# Patient Record
Sex: FEMALE | Race: WHITE | NOT HISPANIC OR LATINO | Employment: PART TIME | URBAN - METROPOLITAN AREA
[De-identification: names, ages, dates, MRNs, and addresses within clinical notes are randomized per-mention and may not be internally consistent; named-entity substitution may affect disease eponyms.]

---

## 2018-01-23 ENCOUNTER — LAB VISIT (OUTPATIENT)
Dept: LAB | Facility: HOSPITAL | Age: 28
End: 2018-01-23
Attending: NURSE PRACTITIONER
Payer: COMMERCIAL

## 2018-01-23 ENCOUNTER — PATIENT MESSAGE (OUTPATIENT)
Dept: GASTROENTEROLOGY | Facility: CLINIC | Age: 28
End: 2018-01-23

## 2018-01-23 ENCOUNTER — OFFICE VISIT (OUTPATIENT)
Dept: GASTROENTEROLOGY | Facility: CLINIC | Age: 28
End: 2018-01-23
Payer: COMMERCIAL

## 2018-01-23 VITALS
HEIGHT: 66 IN | DIASTOLIC BLOOD PRESSURE: 78 MMHG | WEIGHT: 136.88 LBS | BODY MASS INDEX: 22 KG/M2 | HEART RATE: 73 BPM | SYSTOLIC BLOOD PRESSURE: 123 MMHG

## 2018-01-23 DIAGNOSIS — R10.13 ABDOMINAL PAIN, EPIGASTRIC: Primary | ICD-10-CM

## 2018-01-23 DIAGNOSIS — R10.13 ABDOMINAL PAIN, EPIGASTRIC: ICD-10-CM

## 2018-01-23 DIAGNOSIS — R14.0 ABDOMINAL BLOATING: ICD-10-CM

## 2018-01-23 DIAGNOSIS — R10.13 EPIGASTRIC PAIN: ICD-10-CM

## 2018-01-23 LAB
ALBUMIN SERPL BCP-MCNC: 4 G/DL
ALP SERPL-CCNC: 49 U/L
ALT SERPL W/O P-5'-P-CCNC: 20 U/L
ANION GAP SERPL CALC-SCNC: 6 MMOL/L
AST SERPL-CCNC: 24 U/L
BASOPHILS # BLD AUTO: 0.03 K/UL
BASOPHILS NFR BLD: 0.4 %
BILIRUB SERPL-MCNC: 0.4 MG/DL
BUN SERPL-MCNC: 19 MG/DL
CALCIUM SERPL-MCNC: 10.2 MG/DL
CHLORIDE SERPL-SCNC: 102 MMOL/L
CO2 SERPL-SCNC: 30 MMOL/L
CREAT SERPL-MCNC: 0.8 MG/DL
DIFFERENTIAL METHOD: ABNORMAL
EOSINOPHIL # BLD AUTO: 0.2 K/UL
EOSINOPHIL NFR BLD: 2.2 %
ERYTHROCYTE [DISTWIDTH] IN BLOOD BY AUTOMATED COUNT: 12 %
EST. GFR  (AFRICAN AMERICAN): >60 ML/MIN/1.73 M^2
EST. GFR  (NON AFRICAN AMERICAN): >60 ML/MIN/1.73 M^2
GLUCOSE SERPL-MCNC: 86 MG/DL
HCT VFR BLD AUTO: 41.3 %
HGB BLD-MCNC: 14.1 G/DL
IGA SERPL-MCNC: 197 MG/DL
LIPASE SERPL-CCNC: 61 U/L
LYMPHOCYTES # BLD AUTO: 2.7 K/UL
LYMPHOCYTES NFR BLD: 34.8 %
MCH RBC QN AUTO: 32.9 PG
MCHC RBC AUTO-ENTMCNC: 34.1 G/DL
MCV RBC AUTO: 97 FL
MONOCYTES # BLD AUTO: 0.3 K/UL
MONOCYTES NFR BLD: 3.5 %
NEUTROPHILS # BLD AUTO: 4.6 K/UL
NEUTROPHILS NFR BLD: 59.1 %
PLATELET # BLD AUTO: 302 K/UL
PMV BLD AUTO: 10.3 FL
POTASSIUM SERPL-SCNC: 4.5 MMOL/L
PROT SERPL-MCNC: 8 G/DL
RBC # BLD AUTO: 4.28 M/UL
SODIUM SERPL-SCNC: 138 MMOL/L
WBC # BLD AUTO: 7.72 K/UL

## 2018-01-23 PROCEDURE — 83516 IMMUNOASSAY NONANTIBODY: CPT

## 2018-01-23 PROCEDURE — 99999 PR PBB SHADOW E&M-NEW PATIENT-LVL III: CPT | Mod: PBBFAC,,, | Performed by: NURSE PRACTITIONER

## 2018-01-23 PROCEDURE — 80053 COMPREHEN METABOLIC PANEL: CPT

## 2018-01-23 PROCEDURE — 82784 ASSAY IGA/IGD/IGG/IGM EACH: CPT

## 2018-01-23 PROCEDURE — 99203 OFFICE O/P NEW LOW 30 MIN: CPT | Mod: S$GLB,,, | Performed by: NURSE PRACTITIONER

## 2018-01-23 PROCEDURE — 36415 COLL VENOUS BLD VENIPUNCTURE: CPT

## 2018-01-23 PROCEDURE — 85025 COMPLETE CBC W/AUTO DIFF WBC: CPT

## 2018-01-23 PROCEDURE — 83690 ASSAY OF LIPASE: CPT

## 2018-01-23 RX ORDER — LISDEXAMFETAMINE DIMESYLATE 50 MG/1
50 CAPSULE ORAL EVERY MORNING
COMMUNITY
End: 2018-08-02 | Stop reason: SDUPTHER

## 2018-01-23 NOTE — PROGRESS NOTES
Subjective:       Patient ID: James Avelar is a 27 y.o. female.    Chief Complaint: GI Problem (Lactose problems )    HPI  Reports complaints of abdominal swelling and distention with bloating after eating.  She was seen previously by a dietician with fructose and lactose testing.  She has been told previously that she had lactose intolerance.  She used low FODMAP diet and found that she could not tolerate onion, garlic and wheat but particularly the onions and garlic.  Reports however, that avoiding these foods has been very difficult and is affecting her social life and relationships.  She is getting  in October and wants to be able to eat the food at her wedding.    She reports at times that she will have severe pain along with the pressure and bloating.  She denies nausea.  Has had two episodes of vomiting that were associated with larger meal.  Denies heartburn, reflux, indigestion.  Denies bowel changes, diarrhea, black stools.  Reports mother and sister with IBS.      Review of Systems   Constitutional: Negative.  Negative for activity change, appetite change, fatigue, fever and unexpected weight change.   Respiratory: Negative for shortness of breath.    Cardiovascular: Negative for chest pain.   Gastrointestinal: Positive for abdominal distention, abdominal pain and constipation. Negative for blood in stool, diarrhea, nausea and vomiting.   Genitourinary: Negative.    Musculoskeletal: Negative.    Skin: Negative.    Neurological: Negative.    Psychiatric/Behavioral: Negative.        Objective:      Physical Exam   Constitutional: She is oriented to person, place, and time. She appears well-developed and well-nourished. No distress.   HENT:   Head: Normocephalic.   Eyes: No scleral icterus.   Cardiovascular: Normal rate.    Pulmonary/Chest: Effort normal. No respiratory distress.   Abdominal: Soft. Bowel sounds are normal. She exhibits no distension and no mass. There is no tenderness. There is no  guarding.   Musculoskeletal: Normal range of motion.   Neurological: She is alert and oriented to person, place, and time.   Skin: Skin is warm and dry. She is not diaphoretic.   Psychiatric: She has a normal mood and affect. Her behavior is normal. Judgment and thought content normal.   Vitals reviewed.      Assessment:       1. Abdominal pain, epigastric    2. Epigastric pain    3. Abdominal bloating        Plan:         James was seen today for gi problem.    Diagnoses and all orders for this visit:    Abdominal pain, epigastric  -     CBC auto differential; Future  -     Comprehensive metabolic panel; Future  -     Lipase; Future  -     Tissue transglutaminase, IgA; Future  -     IgA; Future  -     US Abdomen Complete; Future    Epigastric pain  -     US Abdomen Complete; Future    Abdominal bloating  -     US Abdomen Complete; Future    Will check labs and US and then can attempt trial of medications for symptom control.  Have discussed food intolerances and functional digestive disorders.    Could consider trial of flagyl for the possibility of SIBO.     Also consider trial of gaviscon and/or bentyl.    Can consider EGD if complaints are persistent.

## 2018-01-25 ENCOUNTER — TELEPHONE (OUTPATIENT)
Dept: GASTROENTEROLOGY | Facility: CLINIC | Age: 28
End: 2018-01-25

## 2018-01-25 LAB — TTG IGA SER IA-ACNC: 4 UNITS

## 2018-01-25 NOTE — TELEPHONE ENCOUNTER
----- Message from SONIA English sent at 1/25/2018  4:23 PM CST -----  Let her know that labs look ok.  Lipase very minimally above normal and not concerning at this time.  Keep US appt.

## 2018-01-30 ENCOUNTER — PATIENT MESSAGE (OUTPATIENT)
Dept: GASTROENTEROLOGY | Facility: CLINIC | Age: 28
End: 2018-01-30

## 2018-01-31 ENCOUNTER — HOSPITAL ENCOUNTER (OUTPATIENT)
Dept: RADIOLOGY | Facility: HOSPITAL | Age: 28
Discharge: HOME | End: 2018-01-31
Attending: NURSE PRACTITIONER
Payer: COMMERCIAL

## 2018-01-31 ENCOUNTER — TELEPHONE (OUTPATIENT)
Dept: GASTROENTEROLOGY | Facility: CLINIC | Age: 28
End: 2018-01-31

## 2018-01-31 DIAGNOSIS — R10.13 ABDOMINAL PAIN, EPIGASTRIC: ICD-10-CM

## 2018-01-31 DIAGNOSIS — R14.0 ABDOMINAL BLOATING: ICD-10-CM

## 2018-01-31 DIAGNOSIS — R10.13 EPIGASTRIC PAIN: ICD-10-CM

## 2018-01-31 PROCEDURE — 76700 US EXAM ABDOM COMPLETE: CPT | Mod: TC

## 2018-01-31 PROCEDURE — 76700 US EXAM ABDOM COMPLETE: CPT | Mod: 26,,, | Performed by: RADIOLOGY

## 2018-02-07 ENCOUNTER — TELEPHONE (OUTPATIENT)
Dept: GASTROENTEROLOGY | Facility: CLINIC | Age: 28
End: 2018-02-07

## 2018-02-07 NOTE — TELEPHONE ENCOUNTER
Please call in [facility] , take one bid.  Disp 60 with 6 refills  To [facility].  I could not find in in e-prescribe    This is an OTC medication, but she was told that if we sent as a prescription that she could use her health savings plan to purchase this.

## 2018-05-22 RX ORDER — LISDEXAMFETAMINE DIMESYLATE 50 MG/1
50 CAPSULE ORAL EVERY MORNING
Qty: 30 CAPSULE | Refills: 0 | Status: CANCELLED | OUTPATIENT
Start: 2018-05-22 | End: 2018-08-20

## 2018-08-02 ENCOUNTER — OFFICE VISIT (OUTPATIENT)
Dept: OBSTETRICS AND GYNECOLOGY | Facility: CLINIC | Age: 28
End: 2018-08-02
Attending: OBSTETRICS & GYNECOLOGY
Payer: COMMERCIAL

## 2018-08-02 VITALS
DIASTOLIC BLOOD PRESSURE: 78 MMHG | BODY MASS INDEX: 21.47 KG/M2 | WEIGHT: 136.81 LBS | HEIGHT: 67 IN | SYSTOLIC BLOOD PRESSURE: 126 MMHG

## 2018-08-02 DIAGNOSIS — Z01.419 ENCOUNTER FOR GYNECOLOGICAL EXAMINATION (GENERAL) (ROUTINE) WITHOUT ABNORMAL FINDINGS: Primary | ICD-10-CM

## 2018-08-02 PROCEDURE — 99999 PR PBB SHADOW E&M-EST. PATIENT-LVL III: CPT | Mod: PBBFAC,,, | Performed by: OBSTETRICS & GYNECOLOGY

## 2018-08-02 PROCEDURE — 99395 PREV VISIT EST AGE 18-39: CPT | Mod: S$GLB,,, | Performed by: OBSTETRICS & GYNECOLOGY

## 2018-08-02 PROCEDURE — 88175 CYTOPATH C/V AUTO FLUID REDO: CPT

## 2018-08-02 NOTE — PROGRESS NOTES
Subjective:       Patient ID: James Avelar is a 28 y.o. female.    Chief Complaint:  Annual Exam (last pap  normal)      There is no problem list on file for this patient.      History of Present Illness  28 y.o. yo  here for annual exam. No gyn complaints. Doing well. New patient visit. Getting  in a couple of months. Periods have been irregular in the past and even on OCP. Sometimes has a period and sometimes does not. Sisters had fertility issues so worried. All questions answered. Try continuous pills for wedding as she does not want period for wedding. [place] in [facility].       History reviewed. No pertinent past medical history.    Past Surgical History:   Procedure Laterality Date    APPENDECTOMY         OB History    Para Term  AB Living   0 0 0 0 0 0   SAB TAB Ectopic Multiple Live Births   0 0 0 0 0             Patient's last menstrual period was 2018 (exact date).   Date of Last Pap: No result found    Review of Systems  Review of Systems   Constitutional: Negative for fatigue and unexpected weight change.   Respiratory: Negative for shortness of breath.    Cardiovascular: Negative for chest pain.   Gastrointestinal: Negative for abdominal pain, constipation, diarrhea, nausea and vomiting.   Genitourinary: Negative for dysuria.   Musculoskeletal: Negative for back pain.   Skin: Negative for rash.   Neurological: Negative for headaches.   Hematological: Does not bruise/bleed easily.   Psychiatric/Behavioral: Negative for behavioral problems.        Objective:   Physical Exam:   Constitutional: She is oriented to person, place, and time. Vital signs are normal. She appears well-developed and well-nourished. No distress.        Pulmonary/Chest: She exhibits no mass. Right breast exhibits no mass, no nipple discharge, no skin change, no tenderness, no bleeding and no swelling. Left breast exhibits no mass, no nipple discharge, no skin change, no tenderness, no  bleeding and no swelling. Breasts are symmetrical.        Abdominal: Soft. Normal appearance and bowel sounds are normal. She exhibits no distension and no mass. There is no tenderness. There is no rebound.     Genitourinary: Vagina normal and uterus normal. There is no rash, tenderness, lesion or injury on the right labia. There is no rash, tenderness, lesion or injury on the left labia. Uterus is not deviated, not enlarged, not fixed, not tender, not hosting fibroids and not experiencing uterine prolapse. Cervix is normal. Right adnexum displays no mass, no tenderness and no fullness. Left adnexum displays no mass, no tenderness and no fullness. No erythema, tenderness, rectocele, cystocele or unspecified prolapse of vaginal walls in the vagina. No vaginal discharge found. Cervix exhibits no motion tenderness, no discharge and no friability.           Musculoskeletal: Normal range of motion and moves all extremeties.      Lymphadenopathy:     She has no axillary adenopathy.        Right: No supraclavicular adenopathy present.        Left: No supraclavicular adenopathy present.    Neurological: She is alert and oriented to person, place, and time.    Skin: Skin is warm and dry.    Psychiatric: She has a normal mood and affect. Her behavior is normal. Judgment normal.        Assessment/ Plan:     1. Encounter for gynecological examination (general) (routine) without abnormal findings  norethindrone-ethinyl estradiol (CYCLAFEM 1/35, 28,) 1-35 mg-mcg per tablet       Follow-up with me in 1 year

## 2019-08-07 DIAGNOSIS — Z01.419 ENCOUNTER FOR GYNECOLOGICAL EXAMINATION (GENERAL) (ROUTINE) WITHOUT ABNORMAL FINDINGS: ICD-10-CM

## 2019-08-07 RX ORDER — NORETHINDRONE AND ETHINYL ESTRADIOL 1 MG-35MCG
KIT ORAL
Qty: 84 TABLET | Refills: 0 | Status: SHIPPED | OUTPATIENT
Start: 2019-08-07 | End: 2019-09-05 | Stop reason: SDUPTHER

## 2019-09-05 ENCOUNTER — OFFICE VISIT (OUTPATIENT)
Dept: OBSTETRICS AND GYNECOLOGY | Facility: CLINIC | Age: 29
End: 2019-09-05
Attending: OBSTETRICS & GYNECOLOGY
Payer: COMMERCIAL

## 2019-09-05 VITALS
DIASTOLIC BLOOD PRESSURE: 60 MMHG | HEIGHT: 67 IN | WEIGHT: 149.94 LBS | SYSTOLIC BLOOD PRESSURE: 100 MMHG | BODY MASS INDEX: 23.53 KG/M2

## 2019-09-05 DIAGNOSIS — Z12.4 PAP SMEAR FOR CERVICAL CANCER SCREENING: Primary | ICD-10-CM

## 2019-09-05 DIAGNOSIS — Z01.419 ENCOUNTER FOR GYNECOLOGICAL EXAMINATION (GENERAL) (ROUTINE) WITHOUT ABNORMAL FINDINGS: ICD-10-CM

## 2019-09-05 PROCEDURE — 88175 CYTOPATH C/V AUTO FLUID REDO: CPT

## 2019-09-05 PROCEDURE — 99395 PREV VISIT EST AGE 18-39: CPT | Mod: S$GLB,,, | Performed by: OBSTETRICS & GYNECOLOGY

## 2019-09-05 PROCEDURE — 99395 PR PREVENTIVE VISIT,EST,18-39: ICD-10-PCS | Mod: S$GLB,,, | Performed by: OBSTETRICS & GYNECOLOGY

## 2019-09-05 PROCEDURE — 99999 PR PBB SHADOW E&M-EST. PATIENT-LVL III: ICD-10-PCS | Mod: PBBFAC,,, | Performed by: OBSTETRICS & GYNECOLOGY

## 2019-09-05 PROCEDURE — 99999 PR PBB SHADOW E&M-EST. PATIENT-LVL III: CPT | Mod: PBBFAC,,, | Performed by: OBSTETRICS & GYNECOLOGY

## 2019-09-05 NOTE — PROGRESS NOTES
Subjective:       Patient ID: James Solares is a 29 y.o. female.    Chief Complaint:  Annual Exam      There is no problem list on file for this patient.      History of Present Illness  29 y.o. yo  here for annual exam. No gyn complaints. Doing well. , no complaints. Discussed PNV and future fertility      History reviewed. No pertinent past medical history.    Past Surgical History:   Procedure Laterality Date    APPENDECTOMY         OB History    Para Term  AB Living   0 0 0 0 0 0   SAB TAB Ectopic Multiple Live Births   0 0 0 0 0       Patient's last menstrual period was 2019.   Date of Last Pap: 2018    Review of Systems  Review of Systems   Constitutional: Negative for fatigue and unexpected weight change.   Respiratory: Negative for shortness of breath.    Cardiovascular: Negative for chest pain.   Gastrointestinal: Negative for abdominal pain, constipation, diarrhea, nausea and vomiting.   Genitourinary: Negative for dysuria.   Musculoskeletal: Negative for back pain.   Skin: Negative for rash.   Neurological: Negative for headaches.   Hematological: Does not bruise/bleed easily.   Psychiatric/Behavioral: Negative for behavioral problems.        Objective:   Physical Exam:   Constitutional: She is oriented to person, place, and time. Vital signs are normal. She appears well-developed and well-nourished. No distress.        Pulmonary/Chest: She exhibits no mass. Right breast exhibits no mass, no nipple discharge, no skin change, no tenderness, no bleeding and no swelling. Left breast exhibits no mass, no nipple discharge, no skin change, no tenderness, no bleeding and no swelling. Breasts are symmetrical.        Abdominal: Soft. Normal appearance and bowel sounds are normal. She exhibits no distension and no mass. There is no tenderness. There is no rebound.     Genitourinary: Vagina normal and uterus normal. There is no rash, tenderness, lesion or  injury on the right labia. There is no rash, tenderness, lesion or injury on the left labia. Uterus is not deviated, not enlarged, not fixed, not tender, not hosting fibroids and not experiencing uterine prolapse. Cervix is normal. Right adnexum displays no mass, no tenderness and no fullness. Left adnexum displays no mass, no tenderness and no fullness. No erythema, tenderness, rectocele, cystocele or unspecified prolapse of vaginal walls in the vagina. No vaginal discharge found. Cervix exhibits no motion tenderness, no discharge and no friability.           Musculoskeletal: Normal range of motion and moves all extremeties.      Lymphadenopathy:     She has no axillary adenopathy.        Right: No supraclavicular adenopathy present.        Left: No supraclavicular adenopathy present.    Neurological: She is alert and oriented to person, place, and time.    Skin: Skin is warm and dry.    Psychiatric: She has a normal mood and affect. Her behavior is normal. Judgment normal.        Assessment/ Plan:     1. Pap smear for cervical cancer screening  Liquid-based pap smear, screening   2. Encounter for gynecological examination (general) (routine) without abnormal findings  norethindrone-ethinyl estradiol (PIRMELLA) 1-35 mg-mcg per tablet       Follow-up with me in 1 year

## 2019-10-26 DIAGNOSIS — Z01.419 ENCOUNTER FOR GYNECOLOGICAL EXAMINATION (GENERAL) (ROUTINE) WITHOUT ABNORMAL FINDINGS: ICD-10-CM

## 2019-10-28 RX ORDER — NORETHINDRONE AND ETHINYL ESTRADIOL 1 MG-35MCG
KIT ORAL
Qty: 84 TABLET | Refills: 0 | OUTPATIENT
Start: 2019-10-28

## 2020-01-08 ENCOUNTER — OFFICE VISIT (OUTPATIENT)
Dept: INTERNAL MEDICINE | Facility: CLINIC | Age: 30
End: 2020-01-08
Payer: COMMERCIAL

## 2020-01-08 VITALS
WEIGHT: 150.81 LBS | BODY MASS INDEX: 23.67 KG/M2 | HEIGHT: 67 IN | HEART RATE: 66 BPM | TEMPERATURE: 99 F | SYSTOLIC BLOOD PRESSURE: 108 MMHG | DIASTOLIC BLOOD PRESSURE: 72 MMHG | RESPIRATION RATE: 16 BRPM

## 2020-01-08 DIAGNOSIS — Z00.00 ANNUAL PHYSICAL EXAM: Primary | ICD-10-CM

## 2020-01-08 PROCEDURE — 99385 PREV VISIT NEW AGE 18-39: CPT | Mod: S$GLB,,, | Performed by: INTERNAL MEDICINE

## 2020-01-08 PROCEDURE — 99385 PR PREVENTIVE VISIT,NEW,18-39: ICD-10-PCS | Mod: S$GLB,,, | Performed by: INTERNAL MEDICINE

## 2020-01-08 PROCEDURE — 99999 PR PBB SHADOW E&M-EST. PATIENT-LVL III: CPT | Mod: PBBFAC,,, | Performed by: INTERNAL MEDICINE

## 2020-01-08 PROCEDURE — 99999 PR PBB SHADOW E&M-EST. PATIENT-LVL III: ICD-10-PCS | Mod: PBBFAC,,, | Performed by: INTERNAL MEDICINE

## 2020-01-08 RX ORDER — VALACYCLOVIR HYDROCHLORIDE 1 G/1
1000 TABLET, FILM COATED ORAL 2 TIMES DAILY PRN
COMMUNITY

## 2020-01-08 NOTE — PROGRESS NOTES
Subjective:       Patient ID: James Solares is a 29 y.o. female.    Chief Complaint: Establish Care and Annual Exam    HPI    29 y.o. female here for annual exam.     Health Maintenance/ Screening:   Lipid disorders/ASCVD risk: due    DM: A1c due  Cervical Cancer (21-65y):  09/2019      Vaccines:   Influenza: utd   Tetanus: thinks utd w/ employee health         Past Medical History:   Diagnosis Date    ADHD     Enlarged thyroid gland      Past Surgical History:   Procedure Laterality Date    APPENDECTOMY  2011    RETINAL LASER PROCEDURE  01/2019     Family History   Problem Relation Age of Onset    Infertility Sister     Infertility Sister     Polycystic ovary syndrome Sister     No Known Problems Mother     No Known Problems Father     Lymphoma Cousin     Breast cancer Neg Hx     Colon cancer Neg Hx     Ovarian cancer Neg Hx      Social History     Socioeconomic History    Marital status:      Spouse name: Not on file    Number of children: Not on file    Years of education: Not on file    Highest education level: Not on file   Occupational History    Not on file   Social Needs    Financial resource strain: Not hard at all    Food insecurity:     Worry: Never true     Inability: Never true    Transportation needs:     Medical: No     Non-medical: No   Tobacco Use    Smoking status: Never Smoker    Smokeless tobacco: Never Used   Substance and Sexual Activity    Alcohol use: Yes     Frequency: 2-4 times a month     Drinks per session: 5 or 6     Binge frequency: Less than monthly    Drug use: No    Sexual activity: Not Currently     Partners: Male     Birth control/protection: Pill, OCP   Lifestyle    Physical activity:     Days per week: 5 days     Minutes per session: 60 min    Stress: Only a little   Relationships    Social connections:     Talks on phone: More than three times a week     Gets together: Once a week     Attends [employer] service: Not on file      "Active member of club or organization: Yes     Attends meetings of clubs or organizations: 1 to 4 times per year     Relationship status:    Other Topics Concern    Not on file   Social History Narrative    Not on file     Review of patient's allergies indicates:  No Known Allergies    Current Outpatient Medications:     norethindrone-ethinyl estradiol (PIRMELLA) 1-35 mg-mcg per tablet, TAKE 1 TABLET BY MOUTH EVERY DAY CONTINUOUS DO NOT TAKE PLACEBOS, Disp: 84 tablet, Rfl: 3    valACYclovir (VALTREX) 1000 MG tablet, Take 1,000 mg by mouth 2 (two) times daily as needed., Disp: , Rfl:       Review of Systems   Constitutional: Negative for activity change and unexpected weight change.   HENT: Negative for hearing loss, rhinorrhea and trouble swallowing.    Eyes: Negative for discharge and visual disturbance.   Respiratory: Negative for chest tightness and wheezing.    Cardiovascular: Negative for chest pain and palpitations.   Gastrointestinal: Negative for blood in stool, constipation, diarrhea and vomiting.   Endocrine: Positive for cold intolerance. Negative for polydipsia and polyuria.   Genitourinary: Negative for difficulty urinating, dysuria, hematuria and menstrual problem.   Musculoskeletal: Negative for arthralgias, joint swelling and neck pain.   Neurological: Negative for weakness and headaches.   Psychiatric/Behavioral: Positive for sleep disturbance (frequently wakes up, but able to fall back asleep without an issue). Negative for confusion and dysphoric mood.       Objective:        Vitals:    01/08/20 1352   BP: 108/72   BP Location: Left arm   Patient Position: Sitting   BP Method: Medium (Manual)   Pulse: 66   Resp: 16   Temp: 98.8 °F (37.1 °C)   TempSrc: Oral   Weight: 68.4 kg (150 lb 12.7 oz)   Height: 5' 7" (1.702 m)       Body mass index is 23.62 kg/m².    Physical Exam   Constitutional: She is oriented to person, place, and time. She appears well-developed. No distress.   HENT:   Head: " Normocephalic and atraumatic.   Right Ear: Tympanic membrane normal.   Left Ear: Tympanic membrane normal.   Nose: Nose normal.   Mouth/Throat: Oropharynx is clear and moist.   Eyes: Conjunctivae and EOM are normal.   Neck: Normal range of motion. Neck supple. No tracheal deviation present. Thyromegaly present. No thyroid mass present.   Cardiovascular: Normal rate, regular rhythm, normal heart sounds and intact distal pulses.   Pulmonary/Chest: Effort normal and breath sounds normal.   Abdominal: Soft. Bowel sounds are normal. She exhibits no distension. There is no tenderness.   Musculoskeletal: Normal range of motion. She exhibits no edema.   Lymphadenopathy:     She has no cervical adenopathy.   Neurological: She is alert and oriented to person, place, and time. No sensory deficit.   Skin: Skin is warm and dry. She is not diaphoretic. No cyanosis. Nails show no clubbing.   Psychiatric: She has a normal mood and affect. Her behavior is normal. Judgment normal.       Assessment:     1. Annual physical exam           Plan:         1. Annual physical exam  - pap utd w/ gyn. Immunizations utd w/ employee health  - CBC auto differential; Future  - Comprehensive metabolic panel; Future  - Hemoglobin A1c; Future  - Lipid panel; Future  - TSH; Future  - Iron and TIBC; Future  - Ferritin; Future           Patient note was created using MModal Dictation.  Any errors in syntax or even information may not have been identified and edited on initial review prior to signing this note.

## 2020-01-15 ENCOUNTER — PATIENT MESSAGE (OUTPATIENT)
Dept: INTERNAL MEDICINE | Facility: CLINIC | Age: 30
End: 2020-01-15

## 2020-01-15 ENCOUNTER — LAB VISIT (OUTPATIENT)
Dept: LAB | Facility: OTHER | Age: 30
End: 2020-01-15
Attending: INTERNAL MEDICINE
Payer: COMMERCIAL

## 2020-01-15 DIAGNOSIS — Z00.00 ANNUAL PHYSICAL EXAM: ICD-10-CM

## 2020-01-15 LAB
ALBUMIN SERPL BCP-MCNC: 3.9 G/DL (ref 3.5–5.2)
ALP SERPL-CCNC: 50 U/L (ref 55–135)
ALT SERPL W/O P-5'-P-CCNC: 21 U/L (ref 10–44)
ANION GAP SERPL CALC-SCNC: 8 MMOL/L (ref 8–16)
AST SERPL-CCNC: 23 U/L (ref 10–40)
BASOPHILS # BLD AUTO: 0.03 K/UL (ref 0–0.2)
BASOPHILS NFR BLD: 0.8 % (ref 0–1.9)
BILIRUB SERPL-MCNC: 0.3 MG/DL (ref 0.1–1)
BUN SERPL-MCNC: 15 MG/DL (ref 6–20)
CALCIUM SERPL-MCNC: 9.4 MG/DL (ref 8.7–10.5)
CHLORIDE SERPL-SCNC: 108 MMOL/L (ref 95–110)
CHOLEST SERPL-MCNC: 135 MG/DL (ref 120–199)
CHOLEST/HDLC SERPL: 2 {RATIO} (ref 2–5)
CO2 SERPL-SCNC: 25 MMOL/L (ref 23–29)
CREAT SERPL-MCNC: 0.9 MG/DL (ref 0.5–1.4)
DIFFERENTIAL METHOD: ABNORMAL
EOSINOPHIL # BLD AUTO: 0.1 K/UL (ref 0–0.5)
EOSINOPHIL NFR BLD: 3 % (ref 0–8)
ERYTHROCYTE [DISTWIDTH] IN BLOOD BY AUTOMATED COUNT: 12.2 % (ref 11.5–14.5)
EST. GFR  (AFRICAN AMERICAN): >60 ML/MIN/1.73 M^2
EST. GFR  (NON AFRICAN AMERICAN): >60 ML/MIN/1.73 M^2
ESTIMATED AVG GLUCOSE: 103 MG/DL (ref 68–131)
FERRITIN SERPL-MCNC: 19 NG/ML (ref 20–300)
GLUCOSE SERPL-MCNC: 99 MG/DL (ref 70–110)
HBA1C MFR BLD HPLC: 5.2 % (ref 4–5.6)
HCT VFR BLD AUTO: 37.4 % (ref 37–48.5)
HDLC SERPL-MCNC: 67 MG/DL (ref 40–75)
HDLC SERPL: 49.6 % (ref 20–50)
HGB BLD-MCNC: 12.5 G/DL (ref 12–16)
IMM GRANULOCYTES # BLD AUTO: 0 K/UL (ref 0–0.04)
IMM GRANULOCYTES NFR BLD AUTO: 0 % (ref 0–0.5)
IRON SERPL-MCNC: 84 UG/DL (ref 30–160)
LDLC SERPL CALC-MCNC: 60.8 MG/DL (ref 63–159)
LYMPHOCYTES # BLD AUTO: 1.8 K/UL (ref 1–4.8)
LYMPHOCYTES NFR BLD: 45.6 % (ref 18–48)
MCH RBC QN AUTO: 33.2 PG (ref 27–31)
MCHC RBC AUTO-ENTMCNC: 33.4 G/DL (ref 32–36)
MCV RBC AUTO: 99 FL (ref 82–98)
MONOCYTES # BLD AUTO: 0.2 K/UL (ref 0.3–1)
MONOCYTES NFR BLD: 5.5 % (ref 4–15)
NEUTROPHILS # BLD AUTO: 1.8 K/UL (ref 1.8–7.7)
NEUTROPHILS NFR BLD: 45.1 % (ref 38–73)
NONHDLC SERPL-MCNC: 68 MG/DL
NRBC BLD-RTO: 0 /100 WBC
PLATELET # BLD AUTO: 252 K/UL (ref 150–350)
PMV BLD AUTO: 10.5 FL (ref 9.2–12.9)
POTASSIUM SERPL-SCNC: 4.3 MMOL/L (ref 3.5–5.1)
PROT SERPL-MCNC: 7 G/DL (ref 6–8.4)
RBC # BLD AUTO: 3.77 M/UL (ref 4–5.4)
SATURATED IRON: 18 % (ref 20–50)
SODIUM SERPL-SCNC: 141 MMOL/L (ref 136–145)
TOTAL IRON BINDING CAPACITY: 468 UG/DL (ref 250–450)
TRANSFERRIN SERPL-MCNC: 316 MG/DL (ref 200–375)
TRIGL SERPL-MCNC: 36 MG/DL (ref 30–150)
TSH SERPL DL<=0.005 MIU/L-ACNC: 1.07 UIU/ML (ref 0.4–4)
WBC # BLD AUTO: 3.99 K/UL (ref 3.9–12.7)

## 2020-01-15 PROCEDURE — 83036 HEMOGLOBIN GLYCOSYLATED A1C: CPT

## 2020-01-15 PROCEDURE — 84443 ASSAY THYROID STIM HORMONE: CPT

## 2020-01-15 PROCEDURE — 85025 COMPLETE CBC W/AUTO DIFF WBC: CPT

## 2020-01-15 PROCEDURE — 80053 COMPREHEN METABOLIC PANEL: CPT

## 2020-01-15 PROCEDURE — 80061 LIPID PANEL: CPT

## 2020-01-15 PROCEDURE — 36415 COLL VENOUS BLD VENIPUNCTURE: CPT

## 2020-01-15 PROCEDURE — 83540 ASSAY OF IRON: CPT

## 2020-01-15 PROCEDURE — 82728 ASSAY OF FERRITIN: CPT

## 2020-04-21 DIAGNOSIS — Z01.84 ANTIBODY RESPONSE EXAMINATION: ICD-10-CM

## 2020-05-18 ENCOUNTER — TELEPHONE (OUTPATIENT)
Dept: OBSTETRICS AND GYNECOLOGY | Facility: CLINIC | Age: 30
End: 2020-05-18

## 2020-05-21 DIAGNOSIS — Z01.84 ANTIBODY RESPONSE EXAMINATION: ICD-10-CM

## 2020-06-18 DIAGNOSIS — Z01.419 ENCOUNTER FOR GYNECOLOGICAL EXAMINATION (GENERAL) (ROUTINE) WITHOUT ABNORMAL FINDINGS: ICD-10-CM

## 2020-06-18 RX ORDER — NORETHINDRONE AND ETHINYL ESTRADIOL 1 MG-35MCG
KIT ORAL
Qty: 112 TABLET | Refills: 1 | Status: SHIPPED | OUTPATIENT
Start: 2020-06-18 | End: 2020-09-08 | Stop reason: SDUPTHER

## 2020-06-18 NOTE — TELEPHONE ENCOUNTER
Dara routed conversation to Suze De Staff 4 hours ago (12:28 PM)      [id number] [place] - [address] [phone]  Katarzyna Diamond 4 hours ago (12:27 PM)     [facility] called asking for a refill on the pts birth control. Thanks.     Incoming call

## 2020-06-20 DIAGNOSIS — Z01.84 ANTIBODY RESPONSE EXAMINATION: ICD-10-CM

## 2020-07-20 DIAGNOSIS — Z01.84 ANTIBODY RESPONSE EXAMINATION: ICD-10-CM

## 2020-07-21 DIAGNOSIS — M79.671 BILATERAL FOOT PAIN: Primary | ICD-10-CM

## 2020-07-21 DIAGNOSIS — M79.672 BILATERAL FOOT PAIN: Primary | ICD-10-CM

## 2020-07-22 ENCOUNTER — OFFICE VISIT (OUTPATIENT)
Dept: ORTHOPEDICS | Facility: CLINIC | Age: 30
End: 2020-07-22
Payer: COMMERCIAL

## 2020-07-22 DIAGNOSIS — M79.672 BILATERAL FOOT PAIN: Primary | ICD-10-CM

## 2020-07-22 DIAGNOSIS — M79.671 BILATERAL FOOT PAIN: Primary | ICD-10-CM

## 2020-07-22 DIAGNOSIS — M62.469 GASTROCNEMIUS EQUINUS, UNSPECIFIED LATERALITY: ICD-10-CM

## 2020-07-22 PROCEDURE — 99999 PR PBB SHADOW E&M-EST. PATIENT-LVL II: ICD-10-PCS | Mod: PBBFAC,,, | Performed by: ORTHOPAEDIC SURGERY

## 2020-07-22 PROCEDURE — 99204 PR OFFICE/OUTPT VISIT, NEW, LEVL IV, 45-59 MIN: ICD-10-PCS | Mod: S$GLB,,, | Performed by: ORTHOPAEDIC SURGERY

## 2020-07-22 PROCEDURE — 99999 PR PBB SHADOW E&M-EST. PATIENT-LVL II: CPT | Mod: PBBFAC,,, | Performed by: ORTHOPAEDIC SURGERY

## 2020-07-22 PROCEDURE — 99204 OFFICE O/P NEW MOD 45 MIN: CPT | Mod: S$GLB,,, | Performed by: ORTHOPAEDIC SURGERY

## 2020-07-23 NOTE — PROGRESS NOTES
"  Subjective:     James Avelar Kofler    Chief Complaint   Patient presents with    Left Hip - Pain       HPI    James is coming in today for left hip, lower extremity, and bilateral foot pain that began about 8+  year(s) ago, referred by Dr. Mccallum. Pt. describes the pain as a 0/10 currently, 6/10 achy pain that does not radiate. She c/o pain at the L SI as well as the distal IT band and plantar fascia. There was not a fall/injury/ or trauma associated with the onset of symptoms. The pain is better with rest, dry needling and worse with activity. Pt notes her left knee "emeterio" frequently. 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 She has been virtually working with a PT friend who is certified in sports. Pt. Denies any other musculoskeletal complaints at this time.     Review of Systems   Constitutional: Negative for chills and fever.   HENT: Negative for hearing loss and tinnitus.    Eyes: Negative for blurred vision and photophobia.   Respiratory: Negative for cough and shortness of breath.    Cardiovascular: Negative for chest pain and leg swelling.   Gastrointestinal: Negative for abdominal pain, heartburn, nausea and vomiting.   Genitourinary: Negative for dysuria and hematuria.   Musculoskeletal: Positive for back pain, joint pain and myalgias. Negative for falls and neck pain.   Skin: Negative for rash.   Neurological: Negative for dizziness, tingling, focal weakness, weakness and headaches.   Endo/Heme/Allergies: Negative for environmental allergies. Does not bruise/bleed easily.   Psychiatric/Behavioral: Negative for depression. The patient is not nervous/anxious.        PAST MEDICAL HISTORY:   Past Medical History:   Diagnosis Date    ADHD     Enlarged thyroid gland      PAST SURGICAL HISTORY:   Past Surgical History:   Procedure Laterality Date    APPENDECTOMY  2011    RETINAL LASER PROCEDURE  01/2019     FAMILY HISTORY:   Family History   Problem Relation Age of Onset "    Infertility Sister     Infertility Sister     Polycystic ovary syndrome Sister     No Known Problems Mother     No Known Problems Father     Lymphoma Cousin     Breast cancer Neg Hx     Colon cancer Neg Hx     Ovarian cancer Neg Hx      SOCIAL HISTORY:   Social History     Socioeconomic History    Marital status:      Spouse name: Not on file    Number of children: Not on file    Years of education: Not on file    Highest education level: Not on file   Occupational History    Not on file   Social Needs    Financial resource strain: Not hard at all    Food insecurity     Worry: Never true     Inability: Never true    Transportation needs     Medical: No     Non-medical: No   Tobacco Use    Smoking status: Never Smoker    Smokeless tobacco: Never Used   Substance and Sexual Activity    Alcohol use: Yes     Frequency: 2-4 times a month     Drinks per session: 5 or 6     Binge frequency: Less than monthly    Drug use: No    Sexual activity: Not Currently     Partners: Male     Birth control/protection: Pill, OCP   Lifestyle    Physical activity     Days per week: 5 days     Minutes per session: 60 min    Stress: Only a little   Relationships    Social connections     Talks on phone: More than three times a week     Gets together: Once a week     Attends Restorationist service: Not on file     Active member of club or organization: Yes     Attends meetings of clubs or organizations: 1 to 4 times per year     Relationship status:    Other Topics Concern    Not on file   Social History Narrative    Physical therapist w/ [employer]       MEDICATIONS:   Current Outpatient Medications:     norethindrone-ethinyl estradiol (PIRMELLA) 1-35 mg-mcg per tablet, TAKE 1 TABLET BY MOUTH EVERY DAY CONTINUOUS DO NOT TAKE PLACEBOS, Disp: 112 tablet, Rfl: 1    PIRMELLA 1-35 mg-mcg per tablet, TAKE 1 TABLET BY MOUTH EVERY DAY CONTINUOUS DO NOT TAKE PLACEBOS, Disp: 84 tablet, Rfl: 0    valACYclovir  "(VALTREX) 1000 MG tablet, Take 1,000 mg by mouth 2 (two) times daily as needed., Disp: , Rfl:   ALLERGIES: Review of patient's allergies indicates:  No Known Allergies    Office note from Dr. Mccallum on 07/22/2020 reviewed:  Bilateral foot pain and gastrocnemius equinus - home exercise given, NSAIDs, rice modalities, stretching program and supportive shoes recommended.  Referral to Sports Medicine for OMT evaluation.     Objective:     VITAL SIGNS: /84   Ht 5' 7" (1.702 m)   Wt 68 kg (150 lb)   BMI 23.49 kg/m²    General    Nursing note and vitals reviewed.  Constitutional: She is oriented to person, place, and time. She appears well-developed and well-nourished.   HENT:   Head: Normocephalic and atraumatic.   no nasal discharge, no external ear redness or discharge   Eyes:   EOM is full and smooth  No eye redness or discharge   Neck: Neck supple. No tracheal deviation present.   Cardiovascular: Normal rate.    2+ Radial pulse bilaterally  2+ Dorsalis Pedis pulse bilaterally  No LE edema appreciated   Pulmonary/Chest: Effort normal. No respiratory distress.   Abdominal: She exhibits no distension.   No rigidity   Neurological: She is alert and oriented to person, place, and time. She exhibits normal muscle tone. Coordination normal.   See details below   Psychiatric: She has a normal mood and affect. Her behavior is normal.                 MUSCULOSKELETAL EXAM:     Lumbar Spine: left lumbar region    Observation:    Posture:  Increased thoracic kyphosis and Posterior pelvis tilt with loss of lumbar lordosis  + mild pelvic obliquity while standing- (improved following OMT).    No edema, erythema, or ecchymosis noted in lumbosacral region.    No midline skin abnormalities.    No atrophy of lower limb musculature.  Leg lengths symmetric.  Gait: Non-antalgic with Neutral ankle mechanics and Neutral medial arch. Gait without trendelenberg, heel walking, toe walking, and tandem walking.    Tenderness:  No " tenderness throughout the lumbar spine, iliolumbar region, posterior pelvis.  + tenderness over the left sacral base  No tenderness over  Piriformis or greater/lesser trochanters.  No bony deformities or step-offs palpated.   + distal left IT band tenderness    Range of Motion:  Active flexion to 60°.  Active extension to 25°.   Active rotation to 30° on left and 30° on right.  Active sidebending to 25° on left and 25° on right.   Passive hip flexion to 135° on left and 135° on right.    Passive hip internal rotation to 45° on left and 45° on right.   Passive hip external rotation to 45° on left* (left SI pain) and 45° on right.   All ROM testing is without pain.    Strength Testing (* = with pain):  Hip flexion - 5/5 on left and 5/5 on right  Hip extension - 5/5 on left and 5/5 on right  Knee flexion - 5/5 on left and 5/5 on right  Knee extension - 5/5 on left and 5/5 on right  Dorsiflexion - 5/5 on left and 5/5 on right  Plantarflexion - 5/5 on left and 5/5 on right  Great toe extension - 5/5 on left and 5/5 on right  Gluteus medius- 4+/5 on left and 4+/5 on right with compensatory firing latissimus dorsi and TFL bilaterally    Special Tests:  Stork test - negative on left and negative on right    Seated straight leg raise - negative on left and negative on right  Supine straight leg raise - negative on left and negative on right   Slump test - negative on left and negative on right  Provocation maneuvers exhibit no worsening of symptoms.    BRANT test - negative  FADIR test - negative    ASIS compression test -positive on left  SI drawer test - positive on left    Structural Exam:  TART (Tissue texture abnormality, Asymmetry,  Restriction of motion and/or Tenderness) changes:     Thoracic Spine   T1 Neutral   T2 Neutral   T3 Neutral   T4 Neutral   T5 Neutral   T6 Neutral   T7 Neutral   T8 Neutral   T9 FRS LEFT   T10 FRS LEFT   T11 Neutral   T12 Neutral     Rib cage: neutral     Lumbar Spine   L1 Neutral   L2  Neutral   L3 Neutral   L4 FRS RIGHT   L5 FRS RIGHT       Pelvis:  · Innominate:Left superior shear  · Pubic bone:Left superior pubic shear    Sacrum:Right unilateral extension    Lower extremity: left posterior fibular head, bilateral anterior jennifer    Key   F= Flexed   E = Extended   R = Rotated   S = Sidebent   TTA = tissue texture abnormality       Neurovascular Exam:  Hamstring/gluteal firing pattern abnormal and asymmetric. Compensatory muscle firing pattern to contralateral upper thoracic and parascapular musculature bilaterally  Lower abdominal strength inhibition with hip flexion bilaterally  Reflexes +2/4 and symmetric at the L4 and S1 dermatomes.    Sensation intact to light touch in the L2-S2 dermatomes bilaterally.   Babinski response downgoing bilaterally.  No pretibial edema or abnormal hair pattern of the shin.    Intact and symmetric DP and PT pulses bilaterally.      Assessment:      Encounter Diagnoses   Name Primary?    SI (sacroiliac) joint dysfunction Yes    Myalgia     Somatic dysfunction of lumbar region     Sacral region somatic dysfunction     Somatic dysfunction of pelvic region     Somatic dysfunction of lower extremity     Somatic dysfunction of thoracic region           Plan:      1.  Chronic low back pain likely secondary to underlying left SI instability from underlying weak core and gluteal musculature and compensatory firing patterns with associated biomechanical restrictions of the lower kinetic chain  - OMT performed today to address associated biomechanical restrictions  and HEP started.   - Recommend OTC NSAIDs and ice up to 20 min at a time as needed pain control  - recommend sural SI belt wear for increase SI support with increased activity and work  - recommend yoga or Pilates exercise routine instead of running or high-intensity interval training until pelvic stability improves  - discussed option of formal physical therapy to address underlying pelvic and SI  instability further if symptoms persist    2. OMT 5-6 regions. Oral consent obtained.  Reviewed benefits and potential side effects.   - OMT indicated today due to signs and symptoms as well as local and remote somatic dysfunction findings and their related neurokinetic, lymphatic, fascial and/or arteriovenous body connections.   - OMT techniques used: Myofascial Release, Muscle Energy, High Velocity Low Amplitude and Articulatory   - Treatment was tolerated well. Improvement noted in segmental mobility post-treatment in dysfunctional regions. There were no adverse events and no complications immediately following treatment.     3. Pt. Given the following HEP:  A)  Pelvic clock exercises given to do from the 6-12 o'clock positions:10-15 reps, twice daily. Hand out of exercise also given.   B) Prone leg extension exercise: firing glut max, then extend leg straight 5 inches then lower leg back down, then relax fired glut max. Repeat 5-10 times on each side, BID  C) Clam shell exercises bilaterally: hold leg in abducted and externally rotated position for 5-10 seconds, repeat 5-10 times  D) Lower abdominal muscle isotonic exercises: Rotate pelvis into the 6 o'clock position and holding it to engage lower abdominal muscles. Then lift up leg, one at a time, alternating for 10-15 reps. Repeat exercises 1-2 times daily. Handout given.     90495 HOME EXERCISE PROGRAM (HEP):  The patient was taught a homegoing physical therapy regimen as described above. The patient demonstrated understanding of the exercises and proper technique of their execution. This interaction took 15 minutes.     4. Follow-up in 4 weeks for reevaluation    5. Patient agreeable to today's plan and all questions were answered    This note is dictated using the M*Modal Fluency Direct word recognition program. There are word recognition mistakes that are occasionally missed on review.

## 2020-07-23 NOTE — PROGRESS NOTES
Subjective:   Chief complaint:   Chief Complaint   Patient presents with    Right Foot - Pain    Left Foot - Pain       HPI:   James Solares is a 30 y.o. female who presents today for evaluation of bilateral foot pain.  Rates pain as 2/10.  Pain has been ongoing for years.  Inciting event: none known.  Treatments tried: dry needling, shoe modifications/inserts/changes, stretching.    Pain is localized to the midsubstance of the plantar fascia/plantar aspect of the arch.  It has been ongoing for 3-4 years.  She self reports long history of bad shoe wear choices while in under grad.  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  She also is an avid runner and reports not smart training and participation in several races recently.  She has been getting periodic treatment from colleagues.  She also describes left SI joint dysfunction as well as generalized left greater than right tightness and pain.  She is interested in beginning of family soon and wants to get her mechanics addressed.  She has tried multiple different shoes without significant improvement.    Answers for HPI/ROS submitted by the patient on 7/19/2020   Leg pain  Pain Chronicity: recurrent  History of trauma: No  Onset: more than 1 year ago  Frequency: daily  Progression since onset: waxing and waning  Injury mechanism: running  injury location: exercising  pain- numeric: 3/10  pain location: left hip, left foot, right foot  pain quality: sharp  Radiating Pain: No  Aggravating factors: activity, exercise, standing, walking  fever: No  inability to bear weight: No  itching: No  joint locking: No  limited range of motion: No  stiffness: No  tingling: No  Treatments tried: brace/corset  physical therapy: not tried  Improvement on treatment: no relief      ROS:  Musculoskeletal: per HPI  Constitutional: Negative for fever  Cardiovascular: Negative for chest pain  Respiratory: Negative for  shortness of breath  Skin: Negative for ulcers or lesions  Neurological: Negative for burning, tingling and numbness  Vascular: Negative for peripheral edema  Heme: Negative for chronic anticoagulation; Negative for history of blood clot  Endocrine: Negative for diabetes  Immune: Negative for inflammatory arthritis  /Nephrology: Negative for ESRD-on hemodialysis       Objective:   Exam:  There were no vitals filed for this visit.  General: No acute distress, well-appearing  Neurologic: Alert and oriented x3  Psychiatric: Appropriate mood and affect, cooperative  Cardiovascular: Regular pulse  Respiratory: Breathing on room air  Skin: No rashes or ulcers  Vascular:  Bilateral feet with palpable dorsalis pedis pulses.  Musculoskeletal:  Standing examination demonstrates neutral ankle and hindfoot alignment.  Arch alignment neutral.  She has abductor weakness on the left with single leg stance.  Leg lengths seem equal.  Bilateral gastroc contractures bilaterally, left greater than right, on the left, she cannot gets past neutral and barely gets past neutral with the knee flexed..  Otherwise ankle range of motion maintained and not irritable.  Ankle is stable to drawer and tilt testing.  Hindfoot is flexible.  Plantar fascia origin bilaterally nontender.  Nontender over abductor origin.  Tinel's at the tarsal tunnel is negative.  Stretch the plantar fascia indicates tightness and no ruptures.  Unable to reproduce pain on exam.  Hallux MP joint motion is not irritable.  Nontender over the tarsometatarsal joints.  No abnormal callusing of the lesser metatarsal seen.  5/5 gastrocsoleus, tibialis anterior, peroneals, posterior tibialis.  Sensation intact to light touch and able to localize throughout superficial peroneal, deep peroneal, tibial nerve distributions.    Imaging:  None - patient wanted to wait    Additional records/labs reviewed:  None      Assessment:     1. Bilateral foot pain, arch    2. Gastrocnemius  "equinus, unspecified laterality         I reviewed imaging, clinical history, and diagnosis as above with the patient. I attempted to use layman's terms to educate the patient as well as utilize foot models and/or pictures.   I personally went through imaging with the patient.  I emphasized the role for non-operative treatment.  Non-operative treatment discussed with patient include: Anti-inflammatory medications or creams, RICE modalities, Stretching program and supportive shoes.  Surgery would be reserved for refractory symptoms.    I do not think her symptoms are consistent with plantar fasciitis.  I do feel she has abductor weakness asymmetrically as well as significant posterior chain tightness.  In addition I think her intrinsics bilaterally are weak.  I demonstrated Dr. Gisella Boo "doming" exercises to her and directed her to several online resources.  I also feel she would benefit from a more global look at her alignment and mechanics by 1 of my colleagues.       Plan:       -referral to Dr. Gillian Serna for possible OMT and overall lower extremity mechanics/alignment evaluation  -f/up PRN    No orders of the defined types were placed in this encounter.      Past Medical History:   Diagnosis Date    ADHD     Enlarged thyroid gland        Past Surgical History:   Procedure Laterality Date    APPENDECTOMY  2011    RETINAL LASER PROCEDURE  01/2019       Family History   Problem Relation Age of Onset    Infertility Sister     Infertility Sister     Polycystic ovary syndrome Sister     No Known Problems Mother     No Known Problems Father     Lymphoma Cousin     Breast cancer Neg Hx     Colon cancer Neg Hx     Ovarian cancer Neg Hx        Social History     Socioeconomic History    Marital status:      Spouse name: Not on file    Number of children: Not on file    Years of education: Not on file    Highest education level: Not on file   Occupational History    Not on file   Social Needs "    Financial resource strain: Not hard at all    Food insecurity     Worry: Never true     Inability: Never true    Transportation needs     Medical: No     Non-medical: No   Tobacco Use    Smoking status: Never Smoker    Smokeless tobacco: Never Used   Substance and Sexual Activity    Alcohol use: Yes     Frequency: 2-4 times a month     Drinks per session: 5 or 6     Binge frequency: Less than monthly    Drug use: No    Sexual activity: Not Currently     Partners: Male     Birth control/protection: Pill, OCP   Lifestyle    Physical activity     Days per week: 5 days     Minutes per session: 60 min    Stress: Only a little   Relationships    Social connections     Talks on phone: More than three times a week     Gets together: Once a week     Attends Restoration service: Not on file     Active member of club or organization: Yes     Attends meetings of clubs or organizations: 1 to 4 times per year     Relationship status:    Other Topics Concern    Not on file   Social History Narrative    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

## 2020-07-25 ENCOUNTER — OFFICE VISIT (OUTPATIENT)
Dept: SPORTS MEDICINE | Facility: CLINIC | Age: 30
End: 2020-07-25
Payer: COMMERCIAL

## 2020-07-25 VITALS
DIASTOLIC BLOOD PRESSURE: 84 MMHG | HEIGHT: 67 IN | SYSTOLIC BLOOD PRESSURE: 120 MMHG | BODY MASS INDEX: 23.54 KG/M2 | WEIGHT: 150 LBS

## 2020-07-25 DIAGNOSIS — M99.05 SOMATIC DYSFUNCTION OF PELVIC REGION: ICD-10-CM

## 2020-07-25 DIAGNOSIS — G89.29 CHRONIC LEFT-SIDED LOW BACK PAIN WITHOUT SCIATICA: ICD-10-CM

## 2020-07-25 DIAGNOSIS — M53.3 SI (SACROILIAC) JOINT DYSFUNCTION: Primary | ICD-10-CM

## 2020-07-25 DIAGNOSIS — M99.04 SACRAL REGION SOMATIC DYSFUNCTION: ICD-10-CM

## 2020-07-25 DIAGNOSIS — M54.50 CHRONIC LEFT-SIDED LOW BACK PAIN WITHOUT SCIATICA: ICD-10-CM

## 2020-07-25 DIAGNOSIS — M79.10 MYALGIA: ICD-10-CM

## 2020-07-25 DIAGNOSIS — M99.03 SOMATIC DYSFUNCTION OF LUMBAR REGION: ICD-10-CM

## 2020-07-25 DIAGNOSIS — M99.06 SOMATIC DYSFUNCTION OF LOWER EXTREMITY: ICD-10-CM

## 2020-07-25 DIAGNOSIS — M99.02 SOMATIC DYSFUNCTION OF THORACIC REGION: ICD-10-CM

## 2020-07-25 PROCEDURE — 98927 OSTEOPATH MANJ 5-6 REGIONS: CPT | Mod: S$GLB,,, | Performed by: NEUROMUSCULOSKELETAL MEDICINE & OMM

## 2020-07-25 PROCEDURE — 98927 PR OSTEOPATHIC MANIP,5-6 BODY REGN: ICD-10-PCS | Mod: S$GLB,,, | Performed by: NEUROMUSCULOSKELETAL MEDICINE & OMM

## 2020-07-25 PROCEDURE — 3008F PR BODY MASS INDEX (BMI) DOCUMENTED: ICD-10-PCS | Mod: CPTII,S$GLB,, | Performed by: NEUROMUSCULOSKELETAL MEDICINE & OMM

## 2020-07-25 PROCEDURE — 99203 PR OFFICE/OUTPT VISIT, NEW, LEVL III, 30-44 MIN: ICD-10-PCS | Mod: 25,S$GLB,, | Performed by: NEUROMUSCULOSKELETAL MEDICINE & OMM

## 2020-07-25 PROCEDURE — 99999 PR PBB SHADOW E&M-EST. PATIENT-LVL III: ICD-10-PCS | Mod: PBBFAC,,, | Performed by: NEUROMUSCULOSKELETAL MEDICINE & OMM

## 2020-07-25 PROCEDURE — 3008F BODY MASS INDEX DOCD: CPT | Mod: CPTII,S$GLB,, | Performed by: NEUROMUSCULOSKELETAL MEDICINE & OMM

## 2020-07-25 PROCEDURE — 97110 THERAPEUTIC EXERCISES: CPT | Mod: S$GLB,,, | Performed by: NEUROMUSCULOSKELETAL MEDICINE & OMM

## 2020-07-25 PROCEDURE — 97110 PR THERAPEUTIC EXERCISES: ICD-10-PCS | Mod: S$GLB,,, | Performed by: NEUROMUSCULOSKELETAL MEDICINE & OMM

## 2020-07-25 PROCEDURE — 99203 OFFICE O/P NEW LOW 30 MIN: CPT | Mod: 25,S$GLB,, | Performed by: NEUROMUSCULOSKELETAL MEDICINE & OMM

## 2020-07-25 PROCEDURE — 99999 PR PBB SHADOW E&M-EST. PATIENT-LVL III: CPT | Mod: PBBFAC,,, | Performed by: NEUROMUSCULOSKELETAL MEDICINE & OMM

## 2020-07-25 NOTE — LETTER
July 25, 2020      Lora Mccallum MD  [address]  [facility] [address]           [facility]  [address]  [facility] [address]  Phone: [phone]          Patient: James Solares   MR Number: [id number]   YOB: 1990   Date of Visit: 7/25/2020       Dear Dr. Lora Mccallum:    Thank you for referring James Solares to me for evaluation. Attached you will find relevant portions of my assessment and plan of care.    If you have questions, please do not hesitate to call me. I look forward to following James Solares along with you.    Sincerely,    Gillian Serna, DO    Enclosure  CC:  No Recipients    If you would like to receive this communication electronically, please contact [email or web address] or [phone] to request more information on [employer] Link access.    For providers and/or their staff who would like to refer a patient to [facility], please contact us through our one-stop-shop provider referral line, [facility], at [phone].    If you feel you have received this communication in error or would no longer like to receive these types of communications, please e-mail [email or web address]

## 2020-08-19 DIAGNOSIS — Z01.84 ANTIBODY RESPONSE EXAMINATION: ICD-10-CM

## 2020-08-20 NOTE — PROGRESS NOTES
Subjective:     James Avelar Kopfler    Chief Complaint   Patient presents with    Follow-up     L SI joint       HPI      James is a 30 y.o. female coming in today for left hip, lower extremity, and bilateral foot pain pain. Since last visit the pain has Improved slightly, but pain still present. Pt has been compliant with her HEP but continues to note achy pain in her SI joint. She has been doing PiYo with modifications but has been unable to run.  Patient notes that she was wearing her SI belt consistently, noting some improvement with this, until she got sun burnt and has forgot to wear it since. The pain is better with rest, dry needling, and SI belt wear and worse with activity. Pt. describes the pain as a 1/10 achy pain that does not radiate. There has not been any new a fall/injury/ or traumas since last visit.  Pt. denies any new musculoskeletal complaints at this time.     Office note from 7/25/20 reviewed      Review of Systems   Constitutional: Negative for chills and fever.   Musculoskeletal: Positive for back pain, joint pain and myalgias. Negative for falls and neck pain.   Neurological: Negative for dizziness, tingling, focal weakness, weakness and headaches.       PAST MEDICAL HISTORY:   Past Medical History:   Diagnosis Date    ADHD     Enlarged thyroid gland      PAST SURGICAL HISTORY:   Past Surgical History:   Procedure Laterality Date    APPENDECTOMY  2011    RETINAL LASER PROCEDURE  01/2019     FAMILY HISTORY:   Family History   Problem Relation Age of Onset    Infertility Sister     Infertility Sister     Polycystic ovary syndrome Sister     No Known Problems Mother     No Known Problems Father     Lymphoma Cousin     Breast cancer Neg Hx     Colon cancer Neg Hx     Ovarian cancer Neg Hx      SOCIAL HISTORY:   Social History     Socioeconomic History    Marital status:      Spouse name: Not on file    Number of children: Not on file    Years of education: Not  "on file    Highest education level: Not on file   Occupational History    Not on file   Social Needs    Financial resource strain: Not hard at all    Food insecurity     Worry: Never true     Inability: Never true    Transportation needs     Medical: No     Non-medical: No   Tobacco Use    Smoking status: Never Smoker    Smokeless tobacco: Never Used   Substance and Sexual Activity    Alcohol use: Yes     Frequency: 2-4 times a month     Drinks per session: 5 or 6     Binge frequency: Less than monthly    Drug use: No    Sexual activity: Not Currently     Partners: Male     Birth control/protection: Pill, OCP   Lifestyle    Physical activity     Days per week: 5 days     Minutes per session: 60 min    Stress: Only a little   Relationships    Social connections     Talks on phone: More than three times a week     Gets together: Once a week     Attends Yazidism service: Not on file     Active member of club or organization: Yes     Attends meetings of clubs or organizations: 1 to 4 times per year     Relationship status:    Other Topics Concern    Not on file   Social History Narrative    Physical therapist w/ [employer]       MEDICATIONS:   Current Outpatient Medications:     norethindrone-ethinyl estradiol (PIRMELLA) 1-35 mg-mcg per tablet, TAKE 1 TABLET BY MOUTH EVERY DAY CONTINUOUS DO NOT TAKE PLACEBOS, Disp: 112 tablet, Rfl: 1    PIRMELLA 1-35 mg-mcg per tablet, TAKE 1 TABLET BY MOUTH EVERY DAY CONTINUOUS DO NOT TAKE PLACEBOS, Disp: 84 tablet, Rfl: 0    valACYclovir (VALTREX) 1000 MG tablet, Take 1,000 mg by mouth 2 (two) times daily as needed., Disp: , Rfl:   ALLERGIES: Review of patient's allergies indicates:  No Known Allergies    Objective:     VITAL SIGNS: /77   Pulse 75   Ht 5' 7" (1.702 m)   Wt 68 kg (150 lb)   BMI 23.49 kg/m²    General    Vitals reviewed.  Constitutional: She is oriented to person, place, and time. She appears well-developed and well-nourished. "   Neurological: She is alert and oriented to person, place, and time.   Psychiatric: She has a normal mood and affect. Her behavior is normal.                 MUSCULOSKELETAL EXAM:     Lumbar Spine: left lumbar region    Observation:    Posture:  Increased thoracic kyphosis and Posterior pelvis tilt with loss of lumbar lordosis  + mild pelvic obliquity while standing- (improved following OMT).    No edema, erythema, or ecchymosis noted in lumbosacral region.    No midline skin abnormalities.    No atrophy of lower limb musculature.  Leg lengths symmetric.  Gait: Non-antalgic with Neutral ankle mechanics and Neutral medial arch. Gait without trendelenberg, heel walking, toe walking, and tandem walking.    Tenderness:  No tenderness throughout the lumbar spine, iliolumbar region, posterior pelvis.  + tenderness over the left sacral base  No tenderness over  Piriformis or greater/lesser trochanters.  No bony deformities or step-offs palpated.   No distal left IT band tenderness    Range of Motion:  Active flexion to 60°.  Active extension to 25°.   Active rotation to 30° on left and 30° on right.  Active sidebending to 25° on left and 25° on right.   Passive hip flexion to 135° on left and 135° on right.    Passive hip internal rotation to 45° on left and 45° on right.   Passive hip external rotation to 45° on left* (left SI pain) and 45° on right.   All ROM testing is without pain.    Strength Testing (* = with pain):  Hip flexion - 5/5 on left and 5/5 on right  Hip extension - 5/5 on left and 5/5 on right  Knee flexion - 5/5 on left and 5/5 on right  Knee extension - 5/5 on left and 5/5 on right  Dorsiflexion - 5/5 on left and 5/5 on right  Plantarflexion - 5/5 on left and 5/5 on right  Great toe extension - 5/5 on left and 5/5 on right  Gluteus medius- 5-/5 on left and 5-/5 on right with compensatory firing latissimus dorsi and TFL bilaterally    Special Tests:  Stork test - negative on left and negative on  right    Seated straight leg raise - negative on left and negative on right  Supine straight leg raise - negative on left and negative on right   Provocation maneuvers exhibit no worsening of symptoms.    BRANT test - negative  FADIR test - negative    ASIS compression test -negative  SI drawer test - negative    Structural Exam:  TART (Tissue texture abnormality, Asymmetry,  Restriction of motion and/or Tenderness) changes:     Thoracic Spine   T1 Neutral   T2 Neutral   T3 Neutral   T4 Neutral   T5 Neutral   T6 Neutral   T7 Neutral   T8 Neutral   T9 Neutral   T10 Neutral   T11 Neutral   T12 Neutral     Rib cage: neutral     Lumbar Spine   L1 Neutral   L2 Neutral   L3 Neutral   L4 FRS LEFT   L5 FRS LEFT       Pelvis:  · Innominate:Left superior shear  · Pubic bone:Left superior pubic shear    Sacrum:Right on Right sacral torsion     Lower extremity: right anterior jennifer    Key   F= Flexed   E = Extended   R = Rotated   S = Sidebent   TTA = tissue texture abnormality       Neurovascular Exam:  Hamstring/gluteal firing pattern abnormal and asymmetric, right worse than left. Compensatory muscle firing pattern to contralateral upper thoracic and parascapular musculature bilaterally  Lower abdominal strength inhibition with hip flexion bilaterally  Reflexes +2/4 and symmetric at the L4 and S1 dermatomes.    Sensation intact to light touch in the L2-S2 dermatomes bilaterally.   Babinski response downgoing bilaterally.  No pretibial edema or abnormal hair pattern of the shin.    Intact and symmetric DP and PT pulses bilaterally.      Assessment:      Encounter Diagnoses   Name Primary?    SI (sacroiliac) joint dysfunction Yes    Chronic left-sided low back pain without sciatica     Myalgia     Sacral region somatic dysfunction     Somatic dysfunction of lumbar region     Somatic dysfunction of pelvic region     Somatic dysfunction of lower extremity           Plan:      1.  Chronic low back pain likely secondary to  underlying left SI instability from underlying weak core and gluteal musculature and compensatory firing patterns with associated biomechanical restrictions of the lower kinetic chain - improved with SI belt wear, but deteriorated with not wearing it.   - OMT performed again today to address associated biomechanical restrictions  and HEP reviewed  - Referral to outpatient PT ([facility] [place]) for NM re-education, pelvic stability, and function movement assessment. Emphasized importance of maintaining HEP after completion of PT.   - Continue OTC NSAIDs and ice up to 20 min at a time as needed pain control  - Continue sural SI belt wear for increase SI support with increased activity and work  - Continue yoga or Pilates exercise routine as tolerated  - Discussed option of left SI PRP injection if instability persists following formal PT  - EDUCATION FOR PRP:  Education provided on the benefits, adverse effects, likely course of treatment and improvement, and post-injection expectations from PRP.  It generally takes 1-3 treatments to have long standing resolution. We reviewed that initially after treatment, pain may become worse and this is an expected response. We instructed that improvement may be experienced within the first two weeks and that most of the patient's improvement will come between weeks 6 and 12. If there is no improvement, we would not repeat. If less than 90% improvement is experienced at 12 weeks, we would likely repeat.James was given a PRP information handout.     After face to face conversation, James expressed understanding that $700 is due at time of check in for PRP procedure. Patient is aware that this procedure is not covered by insurance companies and does not qualify for financial assistance. Payment can be made via cash, check, debit or credit card.       2. OMT 3-4 regions. Oral consent obtained.  Reviewed benefits and potential side effects.   - OMT indicated today due to signs and  symptoms as well as local and remote somatic dysfunction findings and their related neurokinetic, lymphatic, fascial and/or arteriovenous body connections.   - OMT techniques used: Myofascial Release, Muscle Energy and Articulatory   - Treatment was tolerated well. Improvement noted in segmental mobility post-treatment in dysfunctional regions. There were no adverse events and no complications immediately following treatment.     3. Reviewed with pt. the following HEP:  Continue:  A)  Pelvic clock exercises given to do from the 6-12 o'clock positions:10-15 reps, twice daily. Hand out of exercise also given.   B) Prone leg extension exercise: firing glut max, then extend leg straight 5 inches then lower leg back down, then relax fired glut max. Repeat 5-10 times on each side, BID  C) Clam shell exercises bilaterally: hold leg in abducted and externally rotated position for 5-10 seconds, repeat 5-10 times  D) Lower abdominal muscle isotonic exercises: Rotate pelvis into the 6 o'clock position and holding it to engage lower abdominal muscles. Then lift up leg, one at a time, alternating for 10-15 reps. Repeat exercises 1-2 times daily. Handout given.   ADD  E) Abduction resistance band squats: 10-15 reps, 1-2 times a day    [address]39 HOME EXERCISE PROGRAM (HEP):  The patient was taught a homegoing physical therapy regimen as described above. The patient demonstrated understanding of the exercises and proper technique of their execution. This interaction took 15 minutes.     4. Follow-up upon completion of PT if pain persist or deteriorates    5. Patient agreeable to today's plan and all questions were answered    This note is dictated using the M*Modal Fluency Direct word recognition program. There are word recognition mistakes that are occasionally missed on review.

## 2020-08-22 ENCOUNTER — OFFICE VISIT (OUTPATIENT)
Dept: SPORTS MEDICINE | Facility: CLINIC | Age: 30
End: 2020-08-22
Payer: COMMERCIAL

## 2020-08-22 VITALS
HEIGHT: 67 IN | BODY MASS INDEX: 23.54 KG/M2 | WEIGHT: 150 LBS | SYSTOLIC BLOOD PRESSURE: 124 MMHG | HEART RATE: 75 BPM | DIASTOLIC BLOOD PRESSURE: 77 MMHG

## 2020-08-22 DIAGNOSIS — M79.10 MYALGIA: ICD-10-CM

## 2020-08-22 DIAGNOSIS — M54.50 CHRONIC LEFT-SIDED LOW BACK PAIN WITHOUT SCIATICA: ICD-10-CM

## 2020-08-22 DIAGNOSIS — M99.06 SOMATIC DYSFUNCTION OF LOWER EXTREMITY: ICD-10-CM

## 2020-08-22 DIAGNOSIS — M99.03 SOMATIC DYSFUNCTION OF LUMBAR REGION: ICD-10-CM

## 2020-08-22 DIAGNOSIS — M53.3 SI (SACROILIAC) JOINT DYSFUNCTION: Primary | ICD-10-CM

## 2020-08-22 DIAGNOSIS — M99.05 SOMATIC DYSFUNCTION OF PELVIC REGION: ICD-10-CM

## 2020-08-22 DIAGNOSIS — M99.04 SACRAL REGION SOMATIC DYSFUNCTION: ICD-10-CM

## 2020-08-22 DIAGNOSIS — G89.29 CHRONIC LEFT-SIDED LOW BACK PAIN WITHOUT SCIATICA: ICD-10-CM

## 2020-08-22 PROCEDURE — 99999 PR PBB SHADOW E&M-EST. PATIENT-LVL III: CPT | Mod: PBBFAC,,, | Performed by: NEUROMUSCULOSKELETAL MEDICINE & OMM

## 2020-08-22 PROCEDURE — 97110 THERAPEUTIC EXERCISES: CPT | Mod: S$GLB,,, | Performed by: NEUROMUSCULOSKELETAL MEDICINE & OMM

## 2020-08-22 PROCEDURE — 99999 PR PBB SHADOW E&M-EST. PATIENT-LVL III: ICD-10-PCS | Mod: PBBFAC,,, | Performed by: NEUROMUSCULOSKELETAL MEDICINE & OMM

## 2020-08-22 PROCEDURE — 98926 PR OSTEOPATHIC MANIP,3-4 BODY REGN: ICD-10-PCS | Mod: S$GLB,,, | Performed by: NEUROMUSCULOSKELETAL MEDICINE & OMM

## 2020-08-22 PROCEDURE — 3008F PR BODY MASS INDEX (BMI) DOCUMENTED: ICD-10-PCS | Mod: CPTII,S$GLB,, | Performed by: NEUROMUSCULOSKELETAL MEDICINE & OMM

## 2020-08-22 PROCEDURE — 97110 PR THERAPEUTIC EXERCISES: ICD-10-PCS | Mod: S$GLB,,, | Performed by: NEUROMUSCULOSKELETAL MEDICINE & OMM

## 2020-08-22 PROCEDURE — 99214 PR OFFICE/OUTPT VISIT, EST, LEVL IV, 30-39 MIN: ICD-10-PCS | Mod: 25,S$GLB,, | Performed by: NEUROMUSCULOSKELETAL MEDICINE & OMM

## 2020-08-22 PROCEDURE — 3008F BODY MASS INDEX DOCD: CPT | Mod: CPTII,S$GLB,, | Performed by: NEUROMUSCULOSKELETAL MEDICINE & OMM

## 2020-08-22 PROCEDURE — 98926 OSTEOPATH MANJ 3-4 REGIONS: CPT | Mod: S$GLB,,, | Performed by: NEUROMUSCULOSKELETAL MEDICINE & OMM

## 2020-08-22 PROCEDURE — 99214 OFFICE O/P EST MOD 30 MIN: CPT | Mod: 25,S$GLB,, | Performed by: NEUROMUSCULOSKELETAL MEDICINE & OMM

## 2020-08-25 ENCOUNTER — PATIENT MESSAGE (OUTPATIENT)
Dept: INTERNAL MEDICINE | Facility: CLINIC | Age: 30
End: 2020-08-25

## 2020-08-25 ENCOUNTER — CLINICAL SUPPORT (OUTPATIENT)
Dept: REHABILITATION | Facility: OTHER | Age: 30
End: 2020-08-25
Payer: COMMERCIAL

## 2020-08-25 DIAGNOSIS — G89.29 CHRONIC LEFT SI JOINT PAIN: ICD-10-CM

## 2020-08-25 DIAGNOSIS — R29.898 WEAKNESS OF LEFT HIP: ICD-10-CM

## 2020-08-25 DIAGNOSIS — M53.3 CHRONIC LEFT SI JOINT PAIN: ICD-10-CM

## 2020-08-25 DIAGNOSIS — M54.50 CHRONIC LEFT-SIDED LOW BACK PAIN WITHOUT SCIATICA: ICD-10-CM

## 2020-08-25 DIAGNOSIS — G89.29 CHRONIC LEFT-SIDED LOW BACK PAIN WITHOUT SCIATICA: ICD-10-CM

## 2020-08-25 DIAGNOSIS — M79.10 MYALGIA: ICD-10-CM

## 2020-08-25 DIAGNOSIS — M53.3 SI (SACROILIAC) JOINT DYSFUNCTION: ICD-10-CM

## 2020-08-25 PROCEDURE — 97161 PT EVAL LOW COMPLEX 20 MIN: CPT | Mod: PN | Performed by: PHYSICAL THERAPIST

## 2020-08-25 NOTE — PLAN OF CARE
[facility]  Physical Therapy Initial Evaluation    Date: 8/25/2020   Name: James Avelar [facility]  Clinic Number: [id number]    Therapy Diagnosis:   1. SI (sacroiliac) joint dysfunction  Ambulatory referral/consult to Physical/Occupational Therapy   2. Chronic left-sided low back pain without sciatica  Ambulatory referral/consult to Physical/Occupational Therapy   3. Myalgia  Ambulatory referral/consult to Physical/Occupational Therapy   4. Chronic left SI joint pain     5. Weakness of left hip       Physician: Gillian eSrna DO    Physician Orders: PT Eval and Treat   L SI instabtilty and gluteal weakness - HEP, NM re-education, and functional movement assessment needed     Medical Diagnosis from Referral: M53.3 (ICD-10-CM) - SI (sacroiliac) joint dysfunction M54.5,G89.29 (ICD-10-CM) - Chronic left-sided low back pain without sciatica M79.10 (ICD-10-CM) - Myalgia   Evaluation Date: 8/25/2020  Authorization Period Expiration: 12/31/2020  Plan of Care Expiration: 10/30/2020  Visit # / Visits authorized: 1/ 30    Time In: 3:10 pm  Time Out: 3:55pm  Total Appointment Time (timed & untimed codes): 45 minutes    Precautions: Standard    Subjective   Date of onset: chronic, recent exacerbation 2/2020    History of current condition - James reports: Having musculoskeletal issues since highschool and into PT school. L Si joint, B knee, and B plantar fasciitis. L side> R. Main complaint L SI joint pain. No symptoms distal to the knee. Tightness IT band and L hip pain while training for marathon which dry needling helped significantly. She has been unable to run since the marathon and doing low impact exercises pilates/ yoga and walking 5k daily. She has 30 min HEP issued by Dr. Serna that she does daily. Unable to complete twice per day due to time constraints. Her exercises take 30 min to complete. She has not had much improvement with exercise routine. Wearing an SI belt with some  relief. She received information of prp but would like to try PT prior since it is not covered by insurance.      Medical History:   Past Medical History:   Diagnosis Date    ADHD     Enlarged thyroid gland        Surgical History:   James Solares  has a past surgical history that includes Appendectomy (2011) and Retinal laser procedure (01/2019).    Medications:   James has a current medication list which includes the following prescription(s): pirmella, pirmella, and valacyclovir.    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 [employer]  Prior Level of Function: independent with ADL's, running  Current Level of Function: difficulty with lunges, running, hoping, squatting, patient transfers    Pain:  Current 1/10, worst 5/10, best 0/10   Location: left SI joint, buttocks, IT band, anterior knee, B heels  Description: Aching  Aggravating Factors: running, lunges  Easing Factors: SI belt, dry needling    Patients goals: To address her SI instability prior to having children     Objective     Posture:  Rounded shoulders/ anterior tilt of scapulae; L posteriorly rotated innominate possible inflare      Aberrant movements: no reversal of spinal curvature with lumbar flexion and anterior pelvic tilt on ascent     Selective Functional Movement Assessment:  FN: functional, non-painful  FP: functional, painful  DP: dysfunctional, painful  DN: dysfunctional, non-painful    Active Cervical Flexion: DN (chin does not touch chest, pulling upper thoracic)  Active Cervical Extension: FN  Cervical Rotation:  Right:FN  Left: FN  Upper extremity pattern 1 (MRE)  Right: DN (winging of medial and inferior border of scapula)  Left:DN (winging of medial and inferior border of scapula)  Upper extremity pattern 2:  Right:DN  Left:DN   Multi-Segmental Flexion: DN (palms to floor,  no reversal lumbar curve)  Multi-Segmental Extension: DN (hinged lumbar spine with non uniform spinal curve)  Multi-Segmental Rotation:   Right:DN  Left:DN  Single Leg Stance:  Right:FN  Left:DN (< 10 sec EO and EC with increased postural sway)  Overhead Deep Squat: DN (unable to maintain erect spine)    Lumbar spine Rotation R/L AROM: 75%/ 50%      PROM: 100%/100%    Thoracic spine Rotation in lumbar lock R/L AROM: 100%/ 75%        PROM: 100%/100%      MMT    Left  Right    Hip:  Flexion    4+/5   4+/5  Extension   4/5   4+/5 (poor recruitment of gluteals with increased anterior pelvic tilt and erector spinae   Abduction   4/5   5/5  Adduction   5/5   5/5  External Rotation  4/5   5/5  Internal rotation  4+/5   5/5    Knee:  Flexion    5/5   5/5  Extension   5/5   5/5    Ankle:  Dorsiflexion   5/5   5/5    Special Tests:    -Prone Instability Test: positive   -Bridge Test: positive  -Supine to sit: positive L posteriorly rotated innominate  -SI compression/ distraction: negative  -Slump: positive B     Joint Mobility: Central PA's mid to lower thoracic 3-/6, lumbar spine 3/6 pain to overpressure L3-5    Palpation: TTP L PSIS, gluteus medius     Flexibility:    Ely's test: R = neg ; L = neg    Popliteal Angle: R = 0 degrees ; L = 0 degrees   Oumar's test: R = + ; L = +   Kodi test: R = neg ; L = neg      Limitation/Restriction for FOTO lumbar Survey    Therapist reviewed FOTO scores for James Solares on 8/25/2020.   FOTO documents entered into [employer] - see Media section.    Limitation Score: 18%         TREATMENT     James received therapeutic exercises to develop core stabilization for 5 minutes including:    Lower quarter rolling x 5 ea (modified)    Home Exercises and Patient Education Provided    Education provided:   - education in stabilization In quadruped and rolling then transitioning to kneeling and standing exercises when appropriate    Written Home Exercises Provided: yes.  Exercises were  reviewed and James was able to demonstrate them prior to the end of the session.  James demonstrated good  understanding of the education provided.     See EMR under Media for exercises provided 8/25/2020.    Assessment   James is a 30 y.o. female referred to outpatient Physical Therapy with a medical diagnosis of SI joint dysfunction. Patient presents with decreased motor control/ stability impairment, decreased flexibility TFL/ IT band, gluteal weakness, and pain limiting performance of ADL's. Global ligament laxity with increased Beighton score. Pt with L posteriorly rotated innominate     Patient prognosis is Good.   Patientt will benefit from skilled outpatient Physical Therapy to address the deficits stated above and in the chart below, provide patient /family education, and to maximize patientt's level of independence.     Plan of care discussed with patient: Yes  Patient's spiritual, cultural and educational needs considered and patient is agreeable to the plan of care and goals as stated below:     Anticipated Barriers for therapy: none    Medical Necessity is demonstrated by the following  History  Co-morbidities and personal factors that may impact the plan of care Co-morbidities:   none    Personal Factors:   no deficits     low   Examination  Body Structures and Functions, activity limitations and participation restrictions that may impact the plan of care Body Regions:   back  lower extremities  trunk    Body Systems:    ROM  strength  balance  motor learning    Participation Restrictions:   running    Activity limitations:   Learning and applying knowledge  no deficits    General Tasks and Commands  no deficits    Communication  no deficits    Mobility  no deficits    Self care  no deficits    Domestic Life  no deficits    Interactions/Relationships  no deficits    Life Areas  no deficits    Community and Social Life  recreation and leisure         moderate   Clinical Presentation evolving  clinical presentation with changing clinical characteristics moderate   Decision Making/ Complexity Score: low     Goals:  Short Term Goals: 4 weeks   1. Patient to demonstrate improved body mechanics/ technique with deep squat   2. Patient to report decreased pain in L Si joint by 30% or greater with ADL's  3. Patient to increased AROM in multisegmental rotation within functional limits for improved performance of ADL's  4. Patient to increase SLS balance to 10 sec eyes closed for improved stability    Long Term Goals: 8 weeks   1. Patient to be independent with home exercise program for improved self management of condition  2. Patient to have decreased subjective report of disability as noted by a score of <15% on the FOTO lumbar questionnaire   3. Patient to increased strength in BLE's to 5/5 or greater for improved performance of ADL's   4. Patient to be able to run 1 mile with <2/10 pain      Plan   Plan of care Certification: 8/25/2020 to 10/30/2020.    Outpatient Physical Therapy 2 times weekly for 8 weeks to include the following interventions: Gait Training, Manual Therapy, Moist Heat/ Ice, Neuromuscular Re-ed, Patient Education, Therapeutic Activites, Therapeutic Exercise and dry needling.     Nereyda Rodas, PT

## 2020-08-26 ENCOUNTER — CLINICAL SUPPORT (OUTPATIENT)
Dept: REHABILITATION | Facility: OTHER | Age: 30
End: 2020-08-26
Payer: COMMERCIAL

## 2020-08-26 DIAGNOSIS — M53.3 CHRONIC LEFT SI JOINT PAIN: ICD-10-CM

## 2020-08-26 DIAGNOSIS — G89.29 CHRONIC LEFT SI JOINT PAIN: ICD-10-CM

## 2020-08-26 DIAGNOSIS — R29.898 WEAKNESS OF LEFT HIP: ICD-10-CM

## 2020-08-26 PROCEDURE — 97140 MANUAL THERAPY 1/> REGIONS: CPT | Mod: PN | Performed by: PHYSICAL THERAPIST

## 2020-08-26 PROCEDURE — 97110 THERAPEUTIC EXERCISES: CPT | Mod: PN | Performed by: PHYSICAL THERAPIST

## 2020-08-26 NOTE — PROGRESS NOTES
"    Physical Therapy Treatment Note     Name: James Avelar [facility]  Clinic Number: [id number]    Therapy Diagnosis:   Encounter Diagnoses   Name Primary?    Chronic left SI joint pain     Weakness of left hip      Physician: Gillian Serna DO    Visit Date: 8/26/2020    Therapy Diagnosis:   1. SI (sacroiliac) joint dysfunction  Ambulatory referral/consult to Physical/Occupational Therapy   2. Chronic left-sided low back pain without sciatica  Ambulatory referral/consult to Physical/Occupational Therapy   3. Myalgia  Ambulatory referral/consult to Physical/Occupational Therapy   4. Chronic left SI joint pain      5. Weakness of left hip         Physician: Gillian Serna DO     Physician Orders: PT Eval and Treat   L SI instabtilty and gluteal weakness - HEP, NM re-education, and functional movement assessment needed      Medical Diagnosis from Referral: M53.3 (ICD-10-CM) - SI (sacroiliac) joint dysfunction M54.5,G89.29 (ICD-10-CM) - Chronic left-sided low back pain without sciatica M79.10 (ICD-10-CM) - Myalgia   Evaluation Date: 8/25/2020  Authorization Period Expiration: 12/31/2020  Plan of Care Expiration: 10/30/2020  Visit # / Visits authorized: 1/ 30    Time In: 7:45am  Time Out: 8:40am  Total Billable Time: 40 minutes    Precautions: Standard    Subjective     Pt reports: Doing the push pull and clamshells this morning.  She was compliant with home exercise program.  Response to previous treatment: no adverse effects   Functional change: none    Pain: 2/10  Location: left SI jt      Objective     James received therapeutic exercises to develop strength and core stabilization for 30 minutes including:    glute sets 10" 10 prone over pillow  glute winks x 10 ea  Multifidus activation in prone 10" x 10  Multifidus with PB againt wall, opp arm lift x 10 ea  Bridging with add ball and pink cook band pull down x 15  Clams 10" x 10      James received the following manual therapy techniques: x 10 " min  Push pull- deferred, normal alignment following self MET prior to visit    Application of FDN: Pt educated on benefits and potential side effects of dry needling. Educated pt on benefits, precautions, side effects followign IDN. Educated pt to use heat following treatment sessions if pt is experiencing pain or soreness. Pt verbalized good understanding of education.  Pt signed written consent to dry needling Rx. Pt gave verbal consent for DN    Slump test Pre: (+), Post (+)    Pt received dry needling to the below listed muscles using 50, 60, 75mm needles.  Multifidus L3-5 B  Quadratus lumborum L  Gluteus medius L      James received hot pack for 10 minutes to low back.      Home Exercises Provided and Patient Education Provided     Education provided:   - HEP    Written Home Exercises Provided: Patient instructed to cont prior HEP.  Exercises were reviewed and James was able to demonstrate them prior to the end of the session.  James demonstrated good  understanding of the education provided.     See EMR under Patient Instructions for exercises provided prior visit.    Assessment     Good tolerance to FDN without adverse effects. Tactile cueing throughout for level pelvis and gluteal activation.   James is progressing well towards her goals.   Pt prognosis is Good.     Pt will continue to benefit from skilled outpatient physical therapy to address the deficits listed in the problem list box on initial evaluation, provide pt/family education and to maximize pt's level of independence in the home and community environment.     Pt's spiritual, cultural and educational needs considered and pt agreeable to plan of care and goals.     Anticipated barriers to physical therapy: none    Goals:     Short Term Goals: 4 weeks   1. Patient to demonstrate improved body mechanics/ technique with deep squat   2. Patient to report decreased pain in L Si joint by 30% or greater with ADL's  3. Patient to increased AROM  in multisegmental rotation within functional limits for improved performance of ADL's  4. Patient to increase SLS balance to 10 sec eyes closed for improved stability     Long Term Goals: 8 weeks   1. Patient to be independent with home exercise program for improved self management of condition  2. Patient to have decreased subjective report of disability as noted by a score of <15% on the FOTO lumbar questionnaire   3. Patient to increased strength in BLE's to 5/5 or greater for improved performance of ADL's   4. Patient to be able to run 1 mile with <2/10 pain    Plan     Continue to progress core stabilization as tolerated    Nereyda Rodas, PT

## 2020-09-03 ENCOUNTER — CLINICAL SUPPORT (OUTPATIENT)
Dept: REHABILITATION | Facility: OTHER | Age: 30
End: 2020-09-03
Payer: COMMERCIAL

## 2020-09-03 DIAGNOSIS — R29.898 WEAKNESS OF LEFT HIP: ICD-10-CM

## 2020-09-03 DIAGNOSIS — G89.29 CHRONIC LEFT SI JOINT PAIN: ICD-10-CM

## 2020-09-03 DIAGNOSIS — M53.3 CHRONIC LEFT SI JOINT PAIN: ICD-10-CM

## 2020-09-03 PROCEDURE — 97110 THERAPEUTIC EXERCISES: CPT | Mod: PN

## 2020-09-03 PROCEDURE — 97140 MANUAL THERAPY 1/> REGIONS: CPT | Mod: PN

## 2020-09-03 NOTE — PROGRESS NOTES
"  Physical Therapy Treatment Note     Name: James Avelar [facility]  Clinic Number: [id number]    Therapy Diagnosis:   Encounter Diagnoses   Name Primary?    Chronic left SI joint pain     Weakness of left hip      Physician: Gillian Serna DO    Visit Date: 9/3/2020    Therapy Diagnosis:   1. SI (sacroiliac) joint dysfunction  Ambulatory referral/consult to Physical/Occupational Therapy   2. Chronic left-sided low back pain without sciatica  Ambulatory referral/consult to Physical/Occupational Therapy   3. Myalgia  Ambulatory referral/consult to Physical/Occupational Therapy   4. Chronic left SI joint pain      5. Weakness of left hip         Physician: Gillian Serna DO     Physician Orders: PT Eval and Treat   L SI instabtilty and gluteal weakness - HEP, NM re-education, and functional movement assessment needed      Medical Diagnosis from Referral: M53.3 (ICD-10-CM) - SI (sacroiliac) joint dysfunction M54.5,G89.29 (ICD-10-CM) - Chronic left-sided low back pain without sciatica M79.10 (ICD-10-CM) - Myalgia   Evaluation Date: 8/25/2020  Authorization Period Expiration: 12/31/2020  Plan of Care Expiration: 10/30/2020  Visit # / Visits authorized: 3/ 30    Time In: 4:15pm  Time Out: 5:15am  Total Billable Time: 60 minutes    Precautions: Standard    Subjective     Pt reports: semi compliance with HEP as she has been OOT. "I felt really good with the needling from last visit."  She was compliant with home exercise program.  Response to previous treatment: no adverse effects   Functional change: none    Pain: 2/10  Location: left SI jt      Objective     James received therapeutic exercises to develop strength and core stabilization for 35 minutes including:    Self MET for posterior innominate rotation 5 x 10" w/ dowel  glute sets 10" 10 prone over pillow  glute winks x 10 ea  Multifidus activation in prone 10" x 10  Multifidus with PB againt wall, opp arm lift x 10 ea  Bridging with add ball and pink " "cook band pull down x 15  +SL bridge x 15    Clams 10" x 10  +SL hip abd 10 x 10"    James received the following manual therapy techniques: x 25 min    Application of FDN: Pt educated on benefits and potential side effects of dry needling. Educated pt on benefits, precautions, side effects followign IDN. Educated pt to use heat following treatment sessions if pt is experiencing pain or soreness. Pt verbalized good understanding of education.  Pt signed written consent to dry needling Rx. Pt gave verbal consent for DN    Slump test Pre: (+), Post (+)    Pt received dry needling to the below listed muscles using 50, 60, 75mm needles.  Multifidus L3-5 B  Multifidus S1 - George  Quadratus lumborum George  Gluteus medius L  Glute min  ITB (prox, mid, dist)      James received hot pack for 00 minutes to low back.      Home Exercises Provided and Patient Education Provided     Education provided:   - HEP    Written Home Exercises Provided: Patient instructed to cont prior HEP.  Exercises were reviewed and James was able to demonstrate them prior to the end of the session.  James demonstrated good  understanding of the education provided.     See EMR under Patient Instructions for exercises provided prior visit.    Assessment     Notable increased tone in R lumbar paraspinals, with increased tissue resistance throughout L paraspinals. Noted twitch response to DN to L paraspinals as well as L glute med/min. Noted reduction of pain and global muscle tone after dry needling session. Demo's improved L glute activation during unilateral glute sets. New exercises added to promote global glute activation with good tolerance.      James is progressing well towards her goals.   Pt prognosis is Good.     Pt will continue to benefit from skilled outpatient physical therapy to address the deficits listed in the problem list box on initial evaluation, provide pt/family education and to maximize pt's level of independence in the " home and community environment.     Pt's spiritual, cultural and educational needs considered and pt agreeable to plan of care and goals.     Anticipated barriers to physical therapy: none    Goals:     Short Term Goals: 4 weeks   1. Patient to demonstrate improved body mechanics/ technique with deep squat   2. Patient to report decreased pain in L Si joint by 30% or greater with ADL's  3. Patient to increased AROM in multisegmental rotation within functional limits for improved performance of ADL's  4. Patient to increase SLS balance to 10 sec eyes closed for improved stability     Long Term Goals: 8 weeks   1. Patient to be independent with home exercise program for improved self management of condition  2. Patient to have decreased subjective report of disability as noted by a score of <15% on the FOTO lumbar questionnaire   3. Patient to increased strength in BLE's to 5/5 or greater for improved performance of ADL's   4. Patient to be able to run 1 mile with <2/10 pain    Plan     Continue to progress core stabilization as tolerated    Sidra Saab, PT

## 2020-09-08 DIAGNOSIS — Z01.419 ENCOUNTER FOR GYNECOLOGICAL EXAMINATION (GENERAL) (ROUTINE) WITHOUT ABNORMAL FINDINGS: ICD-10-CM

## 2020-09-08 RX ORDER — NORETHINDRONE AND ETHINYL ESTRADIOL 1 MG-35MCG
KIT ORAL
Qty: 112 TABLET | Refills: 0 | Status: SHIPPED | OUTPATIENT
Start: 2020-09-08 | End: 2020-10-01 | Stop reason: SDUPTHER

## 2020-09-08 NOTE — TELEPHONE ENCOUNTER
Catalina Arciniega [facility] Staff 7 hours ago (9:06 AM)     Annual 10-1-20 we cancelled on her dr in surgery pt is out of pills needs refill.norethindrone-ethinyl estradiol (PIRMELLA) 1-35 mg-mcg per tablet [facility]/pharmacy #[id number] - [place], [place] - [address]     Routing comment       James Solares [phone]  Catalina Arciniega pt please review and sign

## 2020-09-10 ENCOUNTER — CLINICAL SUPPORT (OUTPATIENT)
Dept: REHABILITATION | Facility: OTHER | Age: 30
End: 2020-09-10
Payer: COMMERCIAL

## 2020-09-10 DIAGNOSIS — M53.3 CHRONIC LEFT SI JOINT PAIN: ICD-10-CM

## 2020-09-10 DIAGNOSIS — G89.29 CHRONIC LEFT SI JOINT PAIN: ICD-10-CM

## 2020-09-10 DIAGNOSIS — R29.898 WEAKNESS OF LEFT HIP: ICD-10-CM

## 2020-09-10 PROCEDURE — 97140 MANUAL THERAPY 1/> REGIONS: CPT | Mod: PN | Performed by: PHYSICAL THERAPIST

## 2020-09-10 PROCEDURE — 97110 THERAPEUTIC EXERCISES: CPT | Mod: PN | Performed by: PHYSICAL THERAPIST

## 2020-09-10 NOTE — PROGRESS NOTES
"  Physical Therapy Treatment Note     Name: James Avelar [facility]  Clinic Number: [id number]    Therapy Diagnosis:   Encounter Diagnoses   Name Primary?    Chronic left SI joint pain     Weakness of left hip      Physician: Gillian Serna DO    Visit Date: 9/10/2020    Therapy Diagnosis:   1. SI (sacroiliac) joint dysfunction  Ambulatory referral/consult to Physical/Occupational Therapy   2. Chronic left-sided low back pain without sciatica  Ambulatory referral/consult to Physical/Occupational Therapy   3. Myalgia  Ambulatory referral/consult to Physical/Occupational Therapy   4. Chronic left SI joint pain      5. Weakness of left hip         Physician: Gillian Serna DO     Physician Orders: PT Eval and Treat   L SI instabtilty and gluteal weakness - HEP, NM re-education, and functional movement assessment needed      Medical Diagnosis from Referral: M53.3 (ICD-10-CM) - SI (sacroiliac) joint dysfunction M54.5,G89.29 (ICD-10-CM) - Chronic left-sided low back pain without sciatica M79.10 (ICD-10-CM) - Myalgia   Evaluation Date: 8/25/2020  Authorization Period Expiration: 12/31/2020  Plan of Care Expiration: 10/30/2020  Visit # / Visits authorized: 3/ 30    Time In: 4:15pm  Time Out: 5:15am  Total Billable Time: 60 minutes    Precautions: Standard    Subjective     Pt reports: walking home after last session and it negated the effects of the needling. Been trying to do push pull with dowel and glute winks daily.   She was compliant with home exercise program.  Response to previous treatment: no adverse effects   Functional change: none    Pain: 2/10  Location: left SI jt      Objective     James received therapeutic exercises to develop strength and core stabilization for 20 minutes including:    Self MET for posterior innominate rotation 5 x 10" w/ dowel  glute sets 10" 10 prone over pillow  glute winks x 10 ea  Multifidus activation in prone 10" x 10  Multifidus with PB againt wall, opp arm lift x 10 " "ea  Bridging with add ball and pink cook band pull down x 15  SL bridge  x 15    Clams 10" x 10  SL hip abd 10 x  10"    James received the following manual therapy techniques: x 25 min    Application of FDN: Pt educated on benefits and potential side effects of dry needling. Educated pt on benefits, precautions, side effects followign IDN. Educated pt to use heat following treatment sessions if pt is experiencing pain or soreness. Pt verbalized good understanding of education.  Pt signed written consent to dry needling Rx. Pt gave verbal consent for DN    Pt received dry needling to the below listed muscles using 50, 60, 75mm needles.  Multifidus L4-5 George  Quadratus lumborum George  Gluteus medius B  Glute min L  ITB (prox, mid, dist), L      James received hot pack for 00 minutes to low back.      Home Exercises Provided and Patient Education Provided     Education provided:   - HEP    Written Home Exercises Provided: Patient instructed to cont prior HEP.  Exercises were reviewed and James was able to demonstrate them prior to the end of the session.  James demonstrated good  understanding of the education provided.     See EMR under Patient Instructions for exercises provided prior visit.    Assessment     Increased grasp and large twitch response through R posterior lateral hip musculature. Some bleeding to R paraspinal trigger point with pressure applied. No other adverse effects noted.       James is progressing well towards her goals.   Pt prognosis is Good.     Pt will continue to benefit from skilled outpatient physical therapy to address the deficits listed in the problem list box on initial evaluation, provide pt/family education and to maximize pt's level of independence in the home and community environment.     Pt's spiritual, cultural and educational needs considered and pt agreeable to plan of care and goals.     Anticipated barriers to physical therapy: none    Goals:     Short Term Goals: 4 " weeks   1. Patient to demonstrate improved body mechanics/ technique with deep squat   2. Patient to report decreased pain in L Si joint by 30% or greater with ADL's  3. Patient to increased AROM in multisegmental rotation within functional limits for improved performance of ADL's  4. Patient to increase SLS balance to 10 sec eyes closed for improved stability     Long Term Goals: 8 weeks   1. Patient to be independent with home exercise program for improved self management of condition  2. Patient to have decreased subjective report of disability as noted by a score of <15% on the FOTO lumbar questionnaire   3. Patient to increased strength in BLE's to 5/5 or greater for improved performance of ADL's   4. Patient to be able to run 1 mile with <2/10 pain    Plan     Continue to progress core stabilization as tolerated    Nereyda Rodas, PT

## 2020-09-17 ENCOUNTER — CLINICAL SUPPORT (OUTPATIENT)
Dept: REHABILITATION | Facility: OTHER | Age: 30
End: 2020-09-17
Payer: COMMERCIAL

## 2020-09-17 DIAGNOSIS — G89.29 CHRONIC LEFT SI JOINT PAIN: ICD-10-CM

## 2020-09-17 DIAGNOSIS — R29.898 WEAKNESS OF LEFT HIP: ICD-10-CM

## 2020-09-17 DIAGNOSIS — M53.3 CHRONIC LEFT SI JOINT PAIN: ICD-10-CM

## 2020-09-17 PROCEDURE — 97140 MANUAL THERAPY 1/> REGIONS: CPT | Mod: PN | Performed by: PHYSICAL THERAPIST

## 2020-09-17 PROCEDURE — 97110 THERAPEUTIC EXERCISES: CPT | Mod: PN | Performed by: PHYSICAL THERAPIST

## 2020-09-17 NOTE — PROGRESS NOTES
"  Physical Therapy Treatment Note     Name: James Avelar [facility]  Clinic Number: [id number]    Therapy Diagnosis:   Encounter Diagnoses   Name Primary?    Chronic left SI joint pain     Weakness of left hip      Physician: Gillian Serna DO    Visit Date: 9/17/2020    Therapy Diagnosis:   1. SI (sacroiliac) joint dysfunction  Ambulatory referral/consult to Physical/Occupational Therapy   2. Chronic left-sided low back pain without sciatica  Ambulatory referral/consult to Physical/Occupational Therapy   3. Myalgia  Ambulatory referral/consult to Physical/Occupational Therapy   4. Chronic left SI joint pain      5. Weakness of left hip         Physician: Gillian Serna DO     Physician Orders: PT Eval and Treat   L SI instabtilty and gluteal weakness - HEP, NM re-education, and functional movement assessment needed      Medical Diagnosis from Referral: M53.3 (ICD-10-CM) - SI (sacroiliac) joint dysfunction M54.5,G89.29 (ICD-10-CM) - Chronic left-sided low back pain without sciatica M79.10 (ICD-10-CM) - Myalgia   Evaluation Date: 8/25/2020  Authorization Period Expiration: 12/31/2020  Plan of Care Expiration: 10/30/2020  Visit # / Visits authorized: 5/ 30    Time In: 4:00pm  Time Out: 4:50am  Total Billable Time: 50 minutes    Precautions: Standard    Subjective     Pt reports: Feels as though she is getting better. Her L knee is not buckling as often. Walking 5k's and wearing her SI belt. Some increased pain today after lifting 50 pound child onto a swing on the floor.   She was compliant with home exercise program.  Response to previous treatment: no adverse effects   Functional change: none    Pain: 3/10  Location: left SI jt      Objective     James received therapeutic exercises to develop strength and core stabilization for 30  minutes including:    Self MET for posterior innominate rotation 5 x 10" w/ dowel  glute winks x 10 ea (discharge to HEP)  Multifidus activation in prone 10" x 10 (discharge " "to HEP)  Multifidus with PB againt wall, opp arm lift x 10 ea  Bridging with add ball and pink cook band pull down x 15  SL bridge  x 15    Clams 10" x 10  SL hip abd 10 x  10" with horizontal adduction GTB  Supine elbow to knee iso holds 3 sec x 10 ea  Rolling lower quarter prone<>supine x 3 ea  Tall kneeling 10# chops x 20 ea  Quadruped hip extension Pink cook band x 10    James received the following manual therapy techniques: x 20 min    Application of FDN: Pt educated on benefits and potential side effects of dry needling. Educated pt on benefits, precautions, side effects followign IDN. Educated pt to use heat following treatment sessions if pt is experiencing pain or soreness. Pt verbalized good understanding of education.  Pt signed written consent to dry needling Rx. Pt gave verbal consent for DN    Pt received dry needling to the below listed muscles using 50, 60, 75mm needles. With PENS pointer ESTIM. Bleeding to vastus lateralis point mid thigh, pressure applied until ceased   Multifidus L4-5 George  Quadratus lumborum George  Gluteus medius B  Glute min L  ITB (prox, mid, dist), L      James received hot pack for 00 minutes to low back.      Home Exercises Provided and Patient Education Provided     Education provided:   - HEP, progression of quadruped and tall kneeling there ex.     Written Home Exercises Provided: Patient instructed to cont prior HEP.  Exercises were reviewed and James was able to demonstrate them prior to the end of the session.  James demonstrated good  understanding of the education provided.     See EMR under Patient Instructions for exercises provided prior visit.    Assessment     Improved segmental rolling noted this session and able to perform without assistance/ prop. Progressing quadruped and tall kneeling core stabilization exercises. Loss of balance in tall kneeling observed.       James is progressing well towards her goals.   Pt prognosis is Good.     Pt will " continue to benefit from skilled outpatient physical therapy to address the deficits listed in the problem list box on initial evaluation, provide pt/family education and to maximize pt's level of independence in the home and community environment.     Pt's spiritual, cultural and educational needs considered and pt agreeable to plan of care and goals.     Anticipated barriers to physical therapy: none    Goals:     Short Term Goals: 4 weeks   1. Patient to demonstrate improved body mechanics/ technique with deep squat   2. Patient to report decreased pain in L Si joint by 30% or greater with ADL's  3. Patient to increased AROM in multisegmental rotation within functional limits for improved performance of ADL's  4. Patient to increase SLS balance to 10 sec eyes closed for improved stability     Long Term Goals: 8 weeks   1. Patient to be independent with home exercise program for improved self management of condition  2. Patient to have decreased subjective report of disability as noted by a score of <15% on the FOTO lumbar questionnaire   3. Patient to increased strength in BLE's to 5/5 or greater for improved performance of ADL's   4. Patient to be able to run 1 mile with <2/10 pain    Plan     Continue to progress core stabilization as tolerated    Nereyda Rodas, PT

## 2020-09-18 DIAGNOSIS — Z01.84 ANTIBODY RESPONSE EXAMINATION: ICD-10-CM

## 2020-09-24 ENCOUNTER — CLINICAL SUPPORT (OUTPATIENT)
Dept: REHABILITATION | Facility: OTHER | Age: 30
End: 2020-09-24
Payer: COMMERCIAL

## 2020-09-24 DIAGNOSIS — R29.898 WEAKNESS OF LEFT HIP: ICD-10-CM

## 2020-09-24 DIAGNOSIS — G89.29 CHRONIC LEFT SI JOINT PAIN: ICD-10-CM

## 2020-09-24 DIAGNOSIS — M53.3 CHRONIC LEFT SI JOINT PAIN: ICD-10-CM

## 2020-09-24 PROCEDURE — 97110 THERAPEUTIC EXERCISES: CPT | Mod: PN | Performed by: PHYSICAL THERAPIST

## 2020-09-24 PROCEDURE — 97140 MANUAL THERAPY 1/> REGIONS: CPT | Mod: PN | Performed by: PHYSICAL THERAPIST

## 2020-09-24 NOTE — PROGRESS NOTES
"  Physical Therapy Treatment Note     Name: James Avelar [facility]  Clinic Number: [id number]    Therapy Diagnosis:   Encounter Diagnoses   Name Primary?    Chronic left SI joint pain     Weakness of left hip      Physician: Gillian Serna DO    Visit Date: 9/24/2020    Therapy Diagnosis:   1. SI (sacroiliac) joint dysfunction  Ambulatory referral/consult to Physical/Occupational Therapy   2. Chronic left-sided low back pain without sciatica  Ambulatory referral/consult to Physical/Occupational Therapy   3. Myalgia  Ambulatory referral/consult to Physical/Occupational Therapy   4. Chronic left SI joint pain      5. Weakness of left hip         Physician: Gillian Serna DO     Physician Orders: PT Eval and Treat   L SI instabtilty and gluteal weakness - HEP, NM re-education, and functional movement assessment needed      Medical Diagnosis from Referral: M53.3 (ICD-10-CM) - SI (sacroiliac) joint dysfunction M54.5,G89.29 (ICD-10-CM) - Chronic left-sided low back pain without sciatica M79.10 (ICD-10-CM) - Myalgia   Evaluation Date: 8/25/2020  Authorization Period Expiration: 12/31/2020  Plan of Care Expiration: 10/30/2020  Visit # / Visits authorized: 6/ 30    Time In: 4:00 pm  Time Out: 4:45 pm  Total Billable Time: 45 minutes    Precautions: Standard    Subjective     Pt reports:Not having as much pain with walking and not always wearing SI belt. Having more pain with lifting and bending at work and needs use of belt. 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    She was compliant with home exercise program.  Response to previous treatment: no adverse effects   Functional change: none    Pain: 3/10  Location: left SI jt      Objective         James received therapeutic exercises to develop strength and core stabilization for 25  minutes including:    Self MET for posterior innominate rotation 5 x 10" w/ dowel  glute winks x 10 ea (discharge to [facility])  Multifidus " "activation in prone 10" x 10 (discharge to HEP)  Multifidus with PB againt wall, opp arm lift x 10 ea  Bridging with magic [place] 5se x 20  SLR with magic [place] 2 x 10  SL bridge  x 15    Clams 10" x 10  SL hip abd 10 x  10" with horizontal adduction GTB  Supine elbow to knee iso holds 3 sec x 10 ea  Rolling lower quarter prone<>supine x 3 ea  Tall kneeling 10# chops x 20 ea  Quadruped hip extension Pink cook band 2 x 15    James received the following manual therapy techniques: x 20 min    Application of FDN: Pt educated on benefits and potential side effects of dry needling. Educated pt on benefits, precautions, side effects followign IDN. Educated pt to use heat following treatment sessions if pt is experiencing pain or soreness. Pt verbalized good understanding of education.  Pt signed written consent to dry needling Rx. Pt gave verbal consent for DN    Pt received dry needling to the below listed muscles using 50, 60, 75mm needles.   Multifidus L4-5,S1 George  Quadratus lumborum George  Gluteus medius B  Glute min L  ITB (prox, mid, dist), L      James received hot pack for 00 minutes to low back.      Home Exercises Provided and Patient Education Provided     Education provided:   - HEP, progression of quadruped and tall kneeling there ex.     Written Home Exercises Provided: Patient instructed to cont prior HEP.  Exercises were reviewed and James was able to demonstrate them prior to the end of the session.  James demonstrated good  understanding of the education provided.     See EMR under Patient Instructions for exercises provided prior visit.    Assessment      Improvements in mulitsegmental rotation and SI jt pain with ambulation. Progressing with quadruped and kneeling stabilization.     James is progressing well towards her goals.   Pt prognosis is Good.     Pt will continue to benefit from skilled outpatient physical therapy to address the deficits listed in the problem list box on initial " evaluation, provide pt/family education and to maximize pt's level of independence in the home and community environment.     Pt's spiritual, cultural and educational needs considered and pt agreeable to plan of care and goals.     Anticipated barriers to physical therapy: none    Goals:     Short Term Goals: 4 weeks   1. Patient to demonstrate improved body mechanics/ technique with deep squat   2. Patient to report decreased pain in L Si joint by 30% or greater with ADL's  3. Patient to increased AROM in multisegmental rotation within functional limits for improved performance of ADL's  4. Patient to increase SLS balance to 10 sec eyes closed for improved stability     Long Term Goals: 8 weeks   1. Patient to be independent with home exercise program for improved self management of condition  2. Patient to have decreased subjective report of disability as noted by a score of <15% on the FOTO lumbar questionnaire   3. Patient to increased strength in BLE's to 5/5 or greater for improved performance of ADL's   4. Patient to be able to run 1 mile with <2/10 pain    Plan     Continue to progress core stabilization as tolerated    Nereyda Rodas, PT

## 2020-09-28 ENCOUNTER — CLINICAL SUPPORT (OUTPATIENT)
Dept: REHABILITATION | Facility: OTHER | Age: 30
End: 2020-09-28
Payer: COMMERCIAL

## 2020-09-28 DIAGNOSIS — M53.3 CHRONIC LEFT SI JOINT PAIN: ICD-10-CM

## 2020-09-28 DIAGNOSIS — G89.29 CHRONIC LEFT SI JOINT PAIN: ICD-10-CM

## 2020-09-28 DIAGNOSIS — R29.898 WEAKNESS OF LEFT HIP: ICD-10-CM

## 2020-09-28 PROCEDURE — 97110 THERAPEUTIC EXERCISES: CPT | Mod: PN

## 2020-09-28 PROCEDURE — 97140 MANUAL THERAPY 1/> REGIONS: CPT | Mod: PN

## 2020-09-28 NOTE — PROGRESS NOTES
"  Physical Therapy Treatment Note     Name: James Avelar [facility]  Clinic Number: [id number]    Therapy Diagnosis:   Encounter Diagnoses   Name Primary?    Chronic left SI joint pain     Weakness of left hip      Physician: Gillian Serna DO    Visit Date: 9/28/2020    Therapy Diagnosis:   1. SI (sacroiliac) joint dysfunction  Ambulatory referral/consult to Physical/Occupational Therapy   2. Chronic left-sided low back pain without sciatica  Ambulatory referral/consult to Physical/Occupational Therapy   3. Myalgia  Ambulatory referral/consult to Physical/Occupational Therapy   4. Chronic left SI joint pain      5. Weakness of left hip         Physician: Gillina Serna DO     Physician Orders: PT Eval and Treat   L SI instabtilty and gluteal weakness - HEP, NM re-education, and functional movement assessment needed      Medical Diagnosis from Referral: M53.3 (ICD-10-CM) - SI (sacroiliac) joint dysfunction M54.5,G89.29 (ICD-10-CM) - Chronic left-sided low back pain without sciatica M79.10 (ICD-10-CM) - Myalgia   Evaluation Date: 8/25/2020  Authorization Period Expiration: 12/31/2020  Plan of Care Expiration: 10/30/2020  Visit # / Visits authorized: 7/ 30    Time In: 7:00 pm  Time Out: 7:55 pm  Total Billable Time: 55 minutes    Precautions: Standard    Subjective     Pt reports: soreness from the weekend. "I was able to walk a full 10K yesterday, but [place] and I started to get sore. Then I did a lot of standing with cooking and house stuff after that and it really made my L back sore."       She was compliant with home exercise program.  Response to previous treatment: no adverse effects   Functional change: none    Pain: 3/10  Location: left SI jt      Objective       James received therapeutic exercises to develop strength and core stabilization for 40  minutes including:    Reviewed HEP for technique, progression.    Self MET for posterior innominate rotation 5 x 10" w/ " "dowel  glute winks x 10 ea (discharge to HEP)  Multifidus activation in prone 10" x 10 (discharge to HEP)  Multifidus with PB againt wall, opp arm lift x 10 ea  Bridging with magic [place] 5se x 20  SLR with magic [place] 2 x 10  SL bridge  x 15    Clams 10" x 10  SL hip abd 10 x  10" with horizontal adduction GTB  Supine elbow to knee iso holds 3 sec x 10 ea  Rolling lower quarter prone<>supine x 3 ea  Tall kneeling 10# chops x 20 ea  Quadruped hip extension Pink cook band 2 x 15    James received the following manual therapy techniques: x 15 min    Application of FDN: Pt educated on benefits and potential side effects of dry needling. Educated pt on benefits, precautions, side effects followign IDN. Educated pt to use heat following treatment sessions if pt is experiencing pain or soreness. Pt verbalized good understanding of education.  Pt signed written consent to dry needling Rx. Pt gave verbal consent for DN    Pt received dry needling to the below listed muscles using 50, 60, 75mm needles.   Multifidus L4-5,S1 [place]  Quadratus lumborum [place]  Gluteus medius B  Glute min L  ITB (prox, mid, dist), L      James received hot pack for 00 minutes to low back.      Home Exercises Provided and Patient Education Provided     Education provided:   - HEP, progression of quadruped and tall kneeling there ex.     Written Home Exercises Provided: Patient instructed to cont prior HEP.  Exercises were reviewed and James was able to demonstrate them prior to the end of the session.  James demonstrated good  understanding of the education provided.     See EMR under Patient Instructions for exercises provided prior visit.    Assessment     Reviewed HEP for understanding, technique, and progression. Pt verbalized understanding and demo's good return technique. Noted improving core activation during dual tasks.    James is progressing well towards her goals.   Pt prognosis is Good.     Pt will continue to benefit from " skilled outpatient physical therapy to address the deficits listed in the problem list box on initial evaluation, provide pt/family education and to maximize pt's level of independence in the home and community environment.     Pt's spiritual, cultural and educational needs considered and pt agreeable to plan of care and goals.     Anticipated barriers to physical therapy: none    Goals:     Short Term Goals: 4 weeks   1. Patient to demonstrate improved body mechanics/ technique with deep squat   2. Patient to report decreased pain in L Si joint by 30% or greater with ADL's  3. Patient to increased AROM in multisegmental rotation within functional limits for improved performance of ADL's  4. Patient to increase SLS balance to 10 sec eyes closed for improved stability     Long Term Goals: 8 weeks   1. Patient to be independent with home exercise program for improved self management of condition  2. Patient to have decreased subjective report of disability as noted by a score of <15% on the FOTO lumbar questionnaire   3. Patient to increased strength in BLE's to 5/5 or greater for improved performance of ADL's   4. Patient to be able to run 1 mile with <2/10 pain    Plan     Continue to progress core stabilization as tolerated    Sidra Saab, PT

## 2020-10-01 ENCOUNTER — OFFICE VISIT (OUTPATIENT)
Dept: OBSTETRICS AND GYNECOLOGY | Facility: CLINIC | Age: 30
End: 2020-10-01
Attending: OBSTETRICS & GYNECOLOGY
Payer: COMMERCIAL

## 2020-10-01 VITALS
HEIGHT: 68 IN | BODY MASS INDEX: 23.45 KG/M2 | DIASTOLIC BLOOD PRESSURE: 78 MMHG | SYSTOLIC BLOOD PRESSURE: 122 MMHG | WEIGHT: 154.75 LBS

## 2020-10-01 DIAGNOSIS — Z12.4 ENCOUNTER FOR PAPANICOLAOU SMEAR FOR CERVICAL CANCER SCREENING: Primary | ICD-10-CM

## 2020-10-01 DIAGNOSIS — Z01.419 ENCOUNTER FOR GYNECOLOGICAL EXAMINATION (GENERAL) (ROUTINE) WITHOUT ABNORMAL FINDINGS: ICD-10-CM

## 2020-10-01 PROCEDURE — 99395 PREV VISIT EST AGE 18-39: CPT | Mod: S$GLB,,, | Performed by: OBSTETRICS & GYNECOLOGY

## 2020-10-01 PROCEDURE — 88175 CYTOPATH C/V AUTO FLUID REDO: CPT

## 2020-10-01 PROCEDURE — 99999 PR PBB SHADOW E&M-EST. PATIENT-LVL III: CPT | Mod: PBBFAC,,, | Performed by: OBSTETRICS & GYNECOLOGY

## 2020-10-01 PROCEDURE — 99999 PR PBB SHADOW E&M-EST. PATIENT-LVL III: ICD-10-PCS | Mod: PBBFAC,,, | Performed by: OBSTETRICS & GYNECOLOGY

## 2020-10-01 PROCEDURE — 99395 PR PREVENTIVE VISIT,EST,18-39: ICD-10-PCS | Mod: S$GLB,,, | Performed by: OBSTETRICS & GYNECOLOGY

## 2020-10-01 RX ORDER — NORETHINDRONE AND ETHINYL ESTRADIOL 1 MG-35MCG
KIT ORAL
Qty: 112 TABLET | Refills: 3 | Status: SHIPPED | OUTPATIENT
Start: 2020-10-01

## 2020-10-01 NOTE — PROGRESS NOTES
Subjective:       Patient ID: James Solares is a 30 y.o. female.    Chief Complaint:  Annual Exam      Patient Active Problem List   Diagnosis    Chronic left SI joint pain    Weakness of left hip       History of Present Illness  30 y.o. yo  here for annual exam. No gyn complaints. Doing well. Considering pregnancy soon, discussed. PNV> explained ovulation. All questions answered    Past Medical History:   Diagnosis Date    ADHD     Enlarged thyroid gland        Past Surgical History:   Procedure Laterality Date    APPENDECTOMY  2011    RETINAL LASER PROCEDURE  2019       OB History    Para Term  AB Living   0 0 0 0 0 0   SAB TAB Ectopic Multiple Live Births   0 0 0 0 0   Obstetric Comments   Menarche- 16       Patient's last menstrual period was 2020.   Date of Last Pap: 2019    Review of Systems  Review of Systems   Constitutional: Negative for fatigue and unexpected weight change.   Respiratory: Negative for shortness of breath.    Cardiovascular: Negative for chest pain.   Gastrointestinal: Negative for abdominal pain, constipation, diarrhea, nausea and vomiting.   Genitourinary: Negative for dysuria.   Musculoskeletal: Negative for back pain.   Skin: Negative for rash.   Neurological: Negative for headaches.   Hematological: Does not bruise/bleed easily.   Psychiatric/Behavioral: Negative for behavioral problems.        Objective:   Physical Exam:   Constitutional: She is oriented to person, place, and time. She appears well-developed and well-nourished. No distress.        Pulmonary/Chest: She exhibits no mass. Right breast exhibits no mass, no nipple discharge, no skin change, no tenderness, no bleeding and no swelling. Left breast exhibits no mass, no nipple discharge, no skin change, no tenderness, no bleeding and no swelling. Breasts are symmetrical.        Abdominal: Soft. Normal appearance and bowel sounds are normal. She exhibits no distension and  no mass. There is no abdominal tenderness. There is no rebound.     Genitourinary:    Vagina and uterus normal.   There is no rash, tenderness, lesion or injury on the right labia. There is no rash, tenderness, lesion or injury on the left labia. Uterus is not deviated, not enlarged, not fixed, not tender, not hosting fibroids and not experiencing uterine prolapse. Cervix is normal. Right adnexum displays no mass, no tenderness and no fullness. Left adnexum displays no mass, no tenderness and no fullness. No erythema, tenderness, rectocele, cystocele or unspecified prolapse of vaginal walls in the vagina. Cervix exhibits no motion tenderness, no discharge and no friability. negative for vaginal discharge          Musculoskeletal: Normal range of motion and moves all extremeties.      Lymphadenopathy:        Right: No supraclavicular adenopathy present.        Left: No supraclavicular adenopathy present.    Neurological: She is alert and oriented to person, place, and time.    Skin: Skin is warm and dry.    Psychiatric: She has a normal mood and affect. Her behavior is normal. Judgment normal.        Assessment/ Plan:     1. Encounter for Papanicolaou smear for cervical cancer screening  Liquid-Based Pap Smear, Screening   2. Encounter for gynecological examination (general) (routine) without abnormal findings  norethindrone-ethinyl estradiol (PIRMELLA) 1-35 mg-mcg per tablet       Follow up in about 1 year (around 10/1/2021) for Annual exam.

## 2020-10-08 ENCOUNTER — CLINICAL SUPPORT (OUTPATIENT)
Dept: REHABILITATION | Facility: OTHER | Age: 30
End: 2020-10-08
Payer: COMMERCIAL

## 2020-10-08 DIAGNOSIS — R29.898 WEAKNESS OF LEFT HIP: ICD-10-CM

## 2020-10-08 DIAGNOSIS — M53.3 CHRONIC LEFT SI JOINT PAIN: ICD-10-CM

## 2020-10-08 DIAGNOSIS — G89.29 CHRONIC LEFT SI JOINT PAIN: ICD-10-CM

## 2020-10-08 PROCEDURE — 97110 THERAPEUTIC EXERCISES: CPT | Mod: PN | Performed by: PHYSICAL THERAPIST

## 2020-10-08 PROCEDURE — 97140 MANUAL THERAPY 1/> REGIONS: CPT | Mod: PN | Performed by: PHYSICAL THERAPIST

## 2020-10-08 NOTE — PLAN OF CARE
Physical Therapy Treatment Note/ progress note     Name: James Avelar [facility] Number: [id number]    Therapy Diagnosis:   Encounter Diagnoses   Name Primary?    Chronic left SI joint pain     Weakness of left hip      Physician: Gillian Serna DO    Visit Date: 10/8/2020    Therapy Diagnosis:   1. SI (sacroiliac) joint dysfunction  Ambulatory referral/consult to Physical/Occupational Therapy   2. Chronic left-sided low back pain without sciatica  Ambulatory referral/consult to Physical/Occupational Therapy   3. Myalgia  Ambulatory referral/consult to Physical/Occupational Therapy   4. Chronic left SI joint pain      5. Weakness of left hip         Physician: Gillian Serna DO     Physician Orders: PT Eval and Treat   L SI instabtilty and gluteal weakness - HEP, NM re-education, and functional movement assessment needed      Medical Diagnosis from Referral: M53.3 (ICD-10-CM) - SI (sacroiliac) joint dysfunction M54.5,G89.29 (ICD-10-CM) - Chronic left-sided low back pain without sciatica M79.10 (ICD-10-CM) - Myalgia   Evaluation Date: 8/25/2020  Authorization Period Expiration: 12/31/2020  Plan of Care Expiration: 10/30/2020  Visit # / Visits authorized: 8/ 30    Time In: 4:45pm  Time Out: 5:30 pm  Total Billable Time: 45 minutes    Precautions: Standard    Subjective     Pt reports: not being as consistent with the exercises and some soreness L Si jt area. Denies any current pain and overall improved since beginning PT. Walking 3 miles most days of the week. Goal is to return to running.       She was compliant with home exercise program.  Response to previous treatment: no adverse effects   Functional change: none    Pain: 0/10  Location: left SI jt      Objective     Selective Functional Movement Assessment:  FN: functional, non-painful  FP: functional, painful  DP: dysfunctional, painful  DN: dysfunctional, non-painful     Multi-Segmental Flexion: FN  Multi-Segmental Extension:  "FN  Multi-Segmental Rotation:   Right:DN  Left:DN  Single Leg Stance:  Right:FN  Left:DN (< 10 sec  EC with increased postural sway)  Overhead Deep Squat: DN (unable to maintain erect spine, corrected with DF lift)           MMT                                        Left                  Right     Hip:  Flexion                                     5/5                  5/5  Extension                                4+/5                  4+/5  Abduction                                5/5                    5/5  Adduction                                5/5                    5/5  External Rotation                    4+/5                    5/5  Internal rotation                       4+/5                  5/5     Knee:  Flexion                                     5/5                    5/5  Extension                                5/5                    5/5     Ankle:  Dorsiflexion                             5/5                    5/5        Palpation: TTP L PSIS, gluteus medius, TFL, vastus lateralis      Flexibility:               Ely's test: R = neg ; L = neg               Popliteal Angle: R = 0 degrees ; L = 0 degrees              Oumar's test: R = + ; L = +              [place] test: R = neg ; L = neg    James received therapeutic exercises to develop strength and core stabilization for 30  minutes including:    Reviewed HEP for technique, progression.    Self MET for posterior innominate rotation 5 x 10" w/ dowel  glute winks x 10 ea (discharge to HEP)  Multifidus activation in prone 10" x 10 (discharge to HEP)  Multifidus with PB againt wall, opp arm lift x 10 ea  Bridging with crossed leg 10se x 10  SLR with magic [place] 2 x 10    Clams 10" x 10  SL hip abd 10 x  10" with horizontal adduction GTB  Supine elbow to knee iso holds 3 sec x 10 ea  Rolling upper and lower quarter prone<>supine x 3 ea  Tall kneeling 10# chops x 20 ea  Quadruped hip extension Pink cook band 2 x 15  +alteranting marching and 3 sec holds " trampoline x 2 min  +monster walks green TB 40 ft x 2    James received the following manual therapy techniques: x 15 min    Application of FDN: Pt educated on benefits and potential side effects of dry needling. Educated pt on benefits, precautions, side effects followign IDN. Educated pt to use heat following treatment sessions if pt is experiencing pain or soreness. Pt verbalized good understanding of education.  Pt signed written consent to dry needling Rx. Pt gave verbal consent for DN    Pt received dry needling to the below listed muscles using 50, 60, 75mm needles.     Gluteus medius L  Glute min L  ITB (prox, mid, dist), L      James received hot pack for 00 minutes to low back.      Home Exercises Provided and Patient Education Provided     Education provided:   - HEP, progression of quadruped and tall kneeling there ex.     Written Home Exercises Provided: Patient instructed to cont prior HEP.  Exercises were reviewed and James was able to demonstrate them prior to the end of the session.  James demonstrated good  understanding of the education provided.     See EMR under Patient Instructions for exercises provided prior visit.    Assessment     Tolerated treatment well with month reassessment performed. Pt demonstrating improved multisegmental flexion, extension, and rotation. Continues with  trunk extension in deep overhead squat and poor single limb stance L compared to R. Dynamic valgus collapse and contralateral hip drop noted with 2-1 hops and step downs. Pt progressing with decreased subjective report of pain with activity and pain free at rest. Beginning functional exercise progression as tolerated. To benefit from 1 time per week for 6-8 more weeks.     James is progressing well towards her goals.   Pt prognosis is Good.     Pt will continue to benefit from skilled outpatient physical therapy to address the deficits listed in the problem list box on initial evaluation, provide  pt/family education and to maximize pt's level of independence in the home and community environment.     Pt's spiritual, cultural and educational needs considered and pt agreeable to plan of care and goals.     Anticipated barriers to physical therapy: none    Goals:     Short Term Goals: 4 weeks   1. Patient to demonstrate improved body mechanics/ technique with deep squat - in progress, not met 2/2 dorsiflexion restriction   2. Patient to report decreased pain in L Si joint by 30% or greater with ADL's- met  3. Patient to increased AROM in multisegmental rotation within functional limits for improved performance of ADL's- met  4. Patient to increase SLS balance to 10 sec eyes closed for improved stability- in progress, not met     Long Term Goals: 8 weeks   1. Patient to be independent with home exercise program for improved self management of condition  2. Patient to have decreased subjective report of disability as noted by a score of <15% on the FOTO lumbar questionnaire   3. Patient to increased strength in BLE's to 5/5 or greater for improved performance of ADL's   4. Patient to be able to run 1 mile with <2/10 pain    Plan     Plan of care Certification: 10/8/2020 to 11/30/2020    Outpatient Physical Therapy 1 times weekly for 8 weeks to include the following interventions: Gait Training, Manual Therapy, Moist Heat/ Ice, Neuromuscular Re-ed, Patient Education, Therapeutic Activites, Therapeutic Exercise and dry needling.     Nereyda Rodas, PT

## 2020-10-15 ENCOUNTER — TELEPHONE (OUTPATIENT)
Dept: SPORTS MEDICINE | Facility: CLINIC | Age: 30
End: 2020-10-15

## 2020-10-15 ENCOUNTER — PATIENT MESSAGE (OUTPATIENT)
Dept: REHABILITATION | Facility: OTHER | Age: 30
End: 2020-10-15

## 2020-10-15 ENCOUNTER — CLINICAL SUPPORT (OUTPATIENT)
Dept: REHABILITATION | Facility: OTHER | Age: 30
End: 2020-10-15
Payer: COMMERCIAL

## 2020-10-15 DIAGNOSIS — G89.29 CHRONIC LEFT SI JOINT PAIN: ICD-10-CM

## 2020-10-15 DIAGNOSIS — M53.3 CHRONIC LEFT SI JOINT PAIN: ICD-10-CM

## 2020-10-15 DIAGNOSIS — R29.898 WEAKNESS OF LEFT HIP: ICD-10-CM

## 2020-10-15 PROCEDURE — 97110 THERAPEUTIC EXERCISES: CPT | Mod: PN | Performed by: PHYSICAL THERAPIST

## 2020-10-15 NOTE — TELEPHONE ENCOUNTER
----- Message from Madeline Ramos sent at 10/15/2020  4:34 PM CDT -----  Pt is calling about a new script for therapy asking for a call back.    [phone]  Contact info

## 2020-10-15 NOTE — PROGRESS NOTES
"  Physical Therapy Treatment Note     Name: James Avelar [facility]  Clinic Number: [id number]    Therapy Diagnosis:   No diagnosis found.  Physician: Gillian Serna DO    Visit Date: 10/15/2020    Therapy Diagnosis:   1. SI (sacroiliac) joint dysfunction  Ambulatory referral/consult to Physical/Occupational Therapy   2. Chronic left-sided low back pain without sciatica  Ambulatory referral/consult to Physical/Occupational Therapy   3. Myalgia  Ambulatory referral/consult to Physical/Occupational Therapy   4. Chronic left SI joint pain      5. Weakness of left hip         Physician: Gillian Serna DO     Physician Orders: PT Eval and Treat   L SI instabtilty and gluteal weakness - HEP, NM re-education, and functional movement assessment needed      Medical Diagnosis from Referral: M53.3 (ICD-10-CM) - SI (sacroiliac) joint dysfunction M54.5,G89.29 (ICD-10-CM) - Chronic left-sided low back pain without sciatica M79.10 (ICD-10-CM) - Myalgia   Evaluation Date: 8/25/2020  Authorization Period Expiration: 12/31/2020  Plan of Care Expiration: 11/30/2020 (received signed updated HEP)  Visit # / Visits authorized: 7/ 30    Time In: 4:45 pm  Time Out: 5:30 pm  Total Billable Time: 45 minutes    Precautions: Standard    Subjective     Pt reports: working in new setting without as much lifting but doing more stairs. She is having minimal to no pain today.       She was compliant with home exercise program.  Response to previous treatment: no adverse effects   Functional change: none    Pain: 1/10  Location: left SI jt      Objective       James received therapeutic exercises to develop strength and core stabilization for 40  minutes including:    Self MET for posterior innominate rotation 5 x 10" w/ dowel  Multifidus with PB againt wall, opp arm lift x 10 ea  Bridging with magic [place] 5se x 20  SLR with magic [place] 2 x 10  SL bridge  10" x 10    Clams 10" x 10  SL hip abd 10 x  10" with horizontal adduction " GTB  Supine elbow to knee iso holds 3 sec x 10 ea  Rolling lower quarter prone<>supine x 3 ea  Tall kneeling 10# chops x 20 ea  Quadruped hip extension [place] cook band 2 x 15    2-1 hop with tactile cueing for hip ABD x 10 ea  Shuttle hops 1 cord x 10, VC's for absorption  RDL with row 10# 2 x 10  Single knee bends 2 x 15   Plank, reviewed for HEP    James received hot pack for 00 minutes to low back.      Home Exercises Provided and Patient Education Provided     Education provided:   - HEP, issued return to run program and to start with brisk walking using the soreness guidelines. Updated core, dynamic stabilization exercises    Written Home Exercises Provided: Patient instructed to cont prior HEP.  Exercises were reviewed and James was able to demonstrate them prior to the end of the session.  James demonstrated good  understanding of the education provided.     See EMR under Patient Instructions for exercises provided prior visit., 10/15    Assessment     Progressing with gentle plyometrics for return to run progression. Tactile, verbal, and visual cueing for avoidance of dynamic valgus collapse.     James is progressing well towards her goals.   Pt prognosis is Good.     Pt will continue to benefit from skilled outpatient physical therapy to address the deficits listed in the problem list box on initial evaluation, provide pt/family education and to maximize pt's level of independence in the home and community environment.     Pt's spiritual, cultural and educational needs considered and pt agreeable to plan of care and goals.     Anticipated barriers to physical therapy: none    Goals:        Short Term Goals: 4 weeks   1. Patient to demonstrate improved body mechanics/ technique with deep squat - in progress, not met 2/2 dorsiflexion restriction   2. Patient to report decreased pain in L Si joint by 30% or greater with ADL's- met  3. Patient to increased AROM in multisegmental rotation within  functional limits for improved performance of ADL's- met  4. Patient to increase SLS balance to 10 sec eyes closed for improved stability- in progress, not met     Long Term Goals: 8 weeks   1. Patient to be independent with home exercise program for improved self management of condition  2. Patient to have decreased subjective report of disability as noted by a score of <15% on the FOTO lumbar questionnaire   3. Patient to increased strength in BLE's to 5/5 or greater for improved performance of ADL's   4. Patient to be able to run 1 mile with <2/10 pain    Plan     Continue to progress core stabilization as tolerated    Nereyda Rodas, PT

## 2020-10-15 NOTE — TELEPHONE ENCOUNTER
Pt state she is possible changing jobs/insurance and possibly needs to switch to an external PT because of this. She states PT is working well to help her pain. Advised pt that if she needs us to write an external PT order we are happy to do so. She will call or send a [facility] message if she needs an external PT order.     Shirlene Figueroa  Clinical Assistant to Dr. Gillian Serna

## 2020-10-18 DIAGNOSIS — Z01.84 ANTIBODY RESPONSE EXAMINATION: ICD-10-CM

## 2020-10-22 ENCOUNTER — CLINICAL SUPPORT (OUTPATIENT)
Dept: REHABILITATION | Facility: OTHER | Age: 30
End: 2020-10-22
Payer: COMMERCIAL

## 2020-10-22 DIAGNOSIS — G89.29 CHRONIC LEFT SI JOINT PAIN: ICD-10-CM

## 2020-10-22 DIAGNOSIS — R29.898 WEAKNESS OF LEFT HIP: ICD-10-CM

## 2020-10-22 DIAGNOSIS — M53.3 CHRONIC LEFT SI JOINT PAIN: ICD-10-CM

## 2020-10-22 PROCEDURE — 97110 THERAPEUTIC EXERCISES: CPT | Mod: PN | Performed by: PHYSICAL THERAPIST

## 2020-10-22 NOTE — PROGRESS NOTES
"  Physical Therapy Treatment Note     Name: James Avelar [facility]  Clinic Number: [id number]    Therapy Diagnosis:   Encounter Diagnoses   Name Primary?    Chronic left SI joint pain     Weakness of left hip      Physician: Gillian Serna DO    Visit Date: 10/22/2020    Therapy Diagnosis:   1. SI (sacroiliac) joint dysfunction  Ambulatory referral/consult to Physical/Occupational Therapy   2. Chronic left-sided low back pain without sciatica  Ambulatory referral/consult to Physical/Occupational Therapy   3. Myalgia  Ambulatory referral/consult to Physical/Occupational Therapy   4. Chronic left SI joint pain      5. Weakness of left hip         Physician: Gillian Serna DO     Physician Orders: PT Eval and Treat   L SI instabtilty and gluteal weakness - HEP, NM re-education, and functional movement assessment needed      Medical Diagnosis from Referral: M53.3 (ICD-10-CM) - SI (sacroiliac) joint dysfunction M54.5,G89.29 (ICD-10-CM) - Chronic left-sided low back pain without sciatica M79.10 (ICD-10-CM) - Myalgia   Evaluation Date: 8/25/2020  Authorization Period Expiration: 12/31/2020  Plan of Care Expiration: 11/30/2020 (received signed updated HEP)  Visit # / Visits authorized: 10/ 30    Time In: 4:45 pm  Time Out: 5:30 pm  Total Billable Time: 45 minutes    Precautions: Standard    Subjective     Pt reports: 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       She was compliant with home exercise program.  Response to previous treatment: no adverse effects   Functional change: none    Pain: 1/10  Location: left SI jt      Objective       James received therapeutic exercises to develop strength and core stabilization for 45  minutes including:    Self MET for posterior innominate rotation 5 x 10" w/ dowel  Multifidus with PB againt wall, opp arm lift x 10 ea  Bridging with PB 5se x 20  SLR with magic [place] 2 x 10  SL bridge  10" x 10    Clams 10" x " "10  SL hip abd 10 x  10" with horizontal adduction GTB  Supine elbow to knee iso holds 3 sec x 10 ea  Rolling lower quarter prone<>supine x 3 ea  Tall kneeling 10# chops x 20 ea  Quadruped hip extension x 15    2-1 hop with tactile cueing for hip ABD x 10 ea  Shuttle BLPD in table top x 10  SL leg press shuttel 32# 15 x 3  RDL with row 10# 2 x 10  Single knee bends 2 x 15   Plank, reviewed for HEP  Y balance 5 x 3  Alternate jog/ holds trampoline    James received hot pack for 00 minutes to low back.      Home Exercises Provided and Patient Education Provided     Education provided:   - HEP, issued return to run program and to start with brisk walking using the soreness guidelines. Updated core, dynamic stabilization exercises    Written Home Exercises Provided: Patient instructed to cont prior HEP.  Exercises were reviewed and James was able to demonstrate them prior to the end of the session.  James demonstrated good  understanding of the education provided.     See EMR under Patient Instructions for exercises provided prior visit., 10/15    Assessment     Tolerated treatment well without exacerbation of pain. Decreased neuromuscular control in SL balance requiring two finger assist for stability.     James is progressing well towards her goals.   Pt prognosis is Good.     Pt will continue to benefit from skilled outpatient physical therapy to address the deficits listed in the problem list box on initial evaluation, provide pt/family education and to maximize pt's level of independence in the home and community environment.     Pt's spiritual, cultural and educational needs considered and pt agreeable to plan of care and goals.     Anticipated barriers to physical therapy: none    Goals:        Short Term Goals: 4 weeks   1. Patient to demonstrate improved body mechanics/ technique with deep squat - in progress, not met 2/2 dorsiflexion restriction   2. Patient to report decreased pain in L Si joint by " 30% or greater with ADL's- met  3. Patient to increased AROM in multisegmental rotation within functional limits for improved performance of ADL's- met  4. Patient to increase SLS balance to 10 sec eyes closed for improved stability- in progress, not met     Long Term Goals: 8 weeks   1. Patient to be independent with home exercise program for improved self management of condition  2. Patient to have decreased subjective report of disability as noted by a score of <15% on the FOTO lumbar questionnaire   3. Patient to increased strength in BLE's to 5/5 or greater for improved performance of ADL's   4. Patient to be able to run 1 mile with <2/10 pain    Plan     Continue to progress core stabilization as tolerated    Nereyda Rodas, PT

## 2020-10-26 LAB
FINAL PATHOLOGIC DIAGNOSIS: NORMAL
Lab: NORMAL

## 2020-11-05 ENCOUNTER — CLINICAL SUPPORT (OUTPATIENT)
Dept: REHABILITATION | Facility: OTHER | Age: 30
End: 2020-11-05
Payer: COMMERCIAL

## 2020-11-05 DIAGNOSIS — G89.29 CHRONIC LEFT SI JOINT PAIN: ICD-10-CM

## 2020-11-05 DIAGNOSIS — R29.898 WEAKNESS OF LEFT HIP: ICD-10-CM

## 2020-11-05 DIAGNOSIS — M53.3 CHRONIC LEFT SI JOINT PAIN: ICD-10-CM

## 2020-11-05 PROCEDURE — 97140 MANUAL THERAPY 1/> REGIONS: CPT | Mod: PN | Performed by: PHYSICAL THERAPIST

## 2020-11-05 PROCEDURE — 97110 THERAPEUTIC EXERCISES: CPT | Mod: PN | Performed by: PHYSICAL THERAPIST

## 2020-11-06 NOTE — PROGRESS NOTES
"  Physical Therapy Treatment Note     Name: James Avelar [facility]  Clinic Number: [id number]    Therapy Diagnosis:   Encounter Diagnoses   Name Primary?    Chronic left SI joint pain     Weakness of left hip      Physician: Gillian Serna DO    Visit Date: 11/5/2020    Therapy Diagnosis:   1. SI (sacroiliac) joint dysfunction  Ambulatory referral/consult to Physical/Occupational Therapy   2. Chronic left-sided low back pain without sciatica  Ambulatory referral/consult to Physical/Occupational Therapy   3. Myalgia  Ambulatory referral/consult to Physical/Occupational Therapy   4. Chronic left SI joint pain      5. Weakness of left hip         Physician: Gillian Serna DO     Physician Orders: PT Eval and Treat   L SI instabtilty and gluteal weakness - HEP, NM re-education, and functional movement assessment needed      Medical Diagnosis from Referral: M53.3 (ICD-10-CM) - SI (sacroiliac) joint dysfunction M54.5,G89.29 (ICD-10-CM) - Chronic left-sided low back pain without sciatica M79.10 (ICD-10-CM) - Myalgia   Evaluation Date: 8/25/2020  Authorization Period Expiration: 12/31/2020  Plan of Care Expiration: 11/30/2020 (received signed updated HEP)  Visit # / Visits authorized: 11/ 30    Time In: 4:45 pm  Time Out: 5:30 pm  Total Billable Time: 45 minutes    Precautions: Standard    Subjective     Pt reports: 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 She is having some pain L SI jt region today and requesting dry needling. Overall, doing better and have been able to brisk walk 2 miles without difficulty.       She was compliant with home exercise program.  Response to previous treatment: no adverse effects   Functional change: brisk walking    Pain: 3/10  Location: left SI jt      Objective       James received therapeutic exercises to develop strength and core stabilization for 30  minutes including:    MET for posterior innominate rotation 5 x 10"  Multifidus with PB " "againt wall, opp arm lift x 10 ea  Bridging with PB 5se x 20  SLR with magic [place] 2 x 10  SL bridge  10" x 10    Clams 10" x 10  SL hip abd 10 x  10" with horizontal adduction GTB  Supine elbow to knee iso holds 3 sec x 10 ea  Rolling lower quarter prone<>supine x 3 ea  Tall kneeling 10# chops x 20 ea  Quadruped hip extension x 15    2-1 hop with tactile cueing for hip ABD x 10 ea  Shuttle BLPD in table top x 10  SL leg press shuttel 32# 15 x 3  RDL  2 x 10  Single knee bends 2 x 15   Plank, side 30" x 2 ea  Y balance 5 x 3  Alternate jog/ holds trampoline  Monster walks 40 ft x 2 GTB    Application of FDN: Pt educated on benefits and potential side effects of dry needling. Educated pt on benefits, precautions, side effects followign IDN. Educated pt to use heat following treatment sessions if pt is experiencing pain or soreness. Pt verbalized good understanding of education.  Pt signed written consent to dry needling Rx. Pt gave verbal consent for DN    Pt received dry needling to the below listed muscles using 50, 60, 75mm needles. X 10 minutes  Multifidus L4-5 [place]  Gluteus medius L  Glute min L  ITB (prox, mid, dist), L      James received hot pack for 00 minutes to low back.      Home Exercises Provided and Patient Education Provided     Education provided:   - HEP continuance; follow soreness guidelines for return to run walking program    Written Home Exercises Provided: Patient instructed to cont prior HEP.  Exercises were reviewed and James was able to demonstrate them prior to the end of the session.  James demonstrated good  understanding of the education provided.     See EMR under Patient Instructions for exercises provided prior visit., 10/15    Assessment     Good tolerance to FDN without adverse effects. Presents to R posteriorly rotated innominate and acute exacerbation of pain. SI belt donned following manual techniques for improved stability during SL and plyometric activities.     James " is progressing well towards her goals.   Pt prognosis is Good.     Pt will continue to benefit from skilled outpatient physical therapy to address the deficits listed in the problem list box on initial evaluation, provide pt/family education and to maximize pt's level of independence in the home and community environment.     Pt's spiritual, cultural and educational needs considered and pt agreeable to plan of care and goals.     Anticipated barriers to physical therapy: none    Goals:        Short Term Goals: 4 weeks   1. Patient to demonstrate improved body mechanics/ technique with deep squat - in progress, not met 2/2 dorsiflexion restriction   2. Patient to report decreased pain in L Si joint by 30% or greater with ADL's- met  3. Patient to increased AROM in multisegmental rotation within functional limits for improved performance of ADL's- met  4. Patient to increase SLS balance to 10 sec eyes closed for improved stability- in progress, not met     Long Term Goals: 8 weeks   1. Patient to be independent with home exercise program for improved self management of condition  2. Patient to have decreased subjective report of disability as noted by a score of <15% on the FOTO lumbar questionnaire   3. Patient to increased strength in BLE's to 5/5 or greater for improved performance of ADL's   4. Patient to be able to run 1 mile with <2/10 pain    Plan     Continue to progress core stabilization as tolerated    Nereyda Rodas, PT

## 2020-11-12 ENCOUNTER — CLINICAL SUPPORT (OUTPATIENT)
Dept: REHABILITATION | Facility: OTHER | Age: 30
End: 2020-11-12
Payer: COMMERCIAL

## 2020-11-12 DIAGNOSIS — G89.29 CHRONIC LEFT SI JOINT PAIN: ICD-10-CM

## 2020-11-12 DIAGNOSIS — M53.3 CHRONIC LEFT SI JOINT PAIN: ICD-10-CM

## 2020-11-12 DIAGNOSIS — R29.898 WEAKNESS OF LEFT HIP: ICD-10-CM

## 2020-11-12 PROCEDURE — 97110 THERAPEUTIC EXERCISES: CPT | Mod: PN | Performed by: PHYSICAL THERAPIST

## 2020-11-13 NOTE — PROGRESS NOTES
"  Physical Therapy Treatment Note     Name: James Avelar [facility]  Clinic Number: [id number]    Therapy Diagnosis:   Encounter Diagnoses   Name Primary?    Chronic left SI joint pain     Weakness of left hip      Physician: Gillian Serna DO    Visit Date: 11/12/2020    Therapy Diagnosis:   1. SI (sacroiliac) joint dysfunction  Ambulatory referral/consult to Physical/Occupational Therapy   2. Chronic left-sided low back pain without sciatica  Ambulatory referral/consult to Physical/Occupational Therapy   3. Myalgia  Ambulatory referral/consult to Physical/Occupational Therapy   4. Chronic left SI joint pain      5. Weakness of left hip         Physician: Gillian Serna DO     Physician Orders: PT Eval and Treat   L SI instabtilty and gluteal weakness - HEP, NM re-education, and functional movement assessment needed      Medical Diagnosis from Referral: M53.3 (ICD-10-CM) - SI (sacroiliac) joint dysfunction M54.5,G89.29 (ICD-10-CM) - Chronic left-sided low back pain without sciatica M79.10 (ICD-10-CM) - Myalgia   Evaluation Date: 8/25/2020  Authorization Period Expiration: 12/31/2020  Plan of Care Expiration: 11/30/2020 (received signed updated HEP)  Visit # / Visits authorized: 12/ 30    Time In: 4:45 pm  Time Out: 5:30 pm  Total Billable Time: 45 minutes    Precautions: Standard    Subjective     Pt reports: Minimal pain L Si jt region. Reports increased patellofemoral pain R knee with the Y balance squats.       She was compliant with home exercise program.  Response to previous treatment: no adverse effects   Functional change: brisk walking    Pain: 3/10  Location: left SI jt      Objective       James received therapeutic exercises to develop strength and core stabilization for 30  minutes including:    MET for R posterior innominate rotation 5 x 10"  Multifidus with PB againt wall, opp arm lift x 10 ea  Bridging with PB 5se x 20  SLR with magic [place] 2 x 10  SL bridge  10" x 10    Clams 10" x " "10  SL hip abd 10 x  10" with horizontal adduction GTB  Supine elbow to knee iso holds 3 sec x 10 ea  Rolling lower quarter prone<>supine x 3 ea  Tall kneeling 10# chops x 20 ea  Quadruped hip extension x 15    2-1 hop with tactile cueing for hip ABD x 10 ea  Lateral agility 90 sec  Shuttle BLPD in table top x 10  SL leg press shuttel 32# 15 x 3  RDL  2 x 10  Single knee bends 2 x 15   Plank, side 30" x 2 ea  Y balance 5 x 3  Alternate jog/ holds trampoline  Monster walks 40 ft x 2 GTB  Deep squats 3 x 10    James received hot pack for 00 minutes to low back.      Home Exercises Provided and Patient Education Provided     Education provided:   - HEP continuance; follow soreness guidelines for return to run walking program    Written Home Exercises Provided: Patient instructed to cont prior HEP.  Exercises were reviewed and James was able to demonstrate them prior to the end of the session.  James demonstrated good  understanding of the education provided.     See EMR under Patient Instructions for exercises provided prior visit., 10/15    Assessment     Patient tolerated treatment well with progression of agility and plymometric without exacerbation of symptoms. Improved body mechanics with deep overhead squat and met goal    James is progressing well towards her goals.   Pt prognosis is Good.     Pt will continue to benefit from skilled outpatient physical therapy to address the deficits listed in the problem list box on initial evaluation, provide pt/family education and to maximize pt's level of independence in the home and community environment.     Pt's spiritual, cultural and educational needs considered and pt agreeable to plan of care and goals.     Anticipated barriers to physical therapy: none    Goals:        Short Term Goals: 4 weeks   1. Patient to demonstrate improved body mechanics/ technique with deep squat -met  2. Patient to report decreased pain in L Si joint by 30% or greater with ADL's- " met  3. Patient to increased AROM in multisegmental rotation within functional limits for improved performance of ADL's- met  4. Patient to increase SLS balance to 10 sec eyes closed for improved stability- in progress, not met     Long Term Goals: 8 weeks   1. Patient to be independent with home exercise program for improved self management of condition  2. Patient to have decreased subjective report of disability as noted by a score of <15% on the FOTO lumbar questionnaire   3. Patient to increased strength in BLE's to 5/5 or greater for improved performance of ADL's   4. Patient to be able to run 1 mile with <2/10 pain    Plan     Continue to progress core stabilization as tolerated    Nereyda Rodas, PT

## 2020-11-17 DIAGNOSIS — Z01.84 ANTIBODY RESPONSE EXAMINATION: ICD-10-CM

## 2020-11-19 ENCOUNTER — CLINICAL SUPPORT (OUTPATIENT)
Dept: REHABILITATION | Facility: OTHER | Age: 30
End: 2020-11-19
Payer: COMMERCIAL

## 2020-11-19 DIAGNOSIS — M53.3 CHRONIC LEFT SI JOINT PAIN: ICD-10-CM

## 2020-11-19 DIAGNOSIS — G89.29 CHRONIC LEFT SI JOINT PAIN: ICD-10-CM

## 2020-11-19 DIAGNOSIS — R29.898 WEAKNESS OF LEFT HIP: ICD-10-CM

## 2020-11-19 PROCEDURE — 97110 THERAPEUTIC EXERCISES: CPT | Mod: PN | Performed by: PHYSICAL THERAPIST

## 2020-11-19 NOTE — PROGRESS NOTES
Physical Therapy Treatment Note     Name: James Avelar [facility]  Clinic Number: [id number]    Therapy Diagnosis:   Encounter Diagnoses   Name Primary?    Chronic left SI joint pain     Weakness of left hip      Physician: Gillian Serna DO    Visit Date: 11/19/2020    Therapy Diagnosis:   1. SI (sacroiliac) joint dysfunction  Ambulatory referral/consult to Physical/Occupational Therapy   2. Chronic left-sided low back pain without sciatica  Ambulatory referral/consult to Physical/Occupational Therapy   3. Myalgia  Ambulatory referral/consult to Physical/Occupational Therapy   4. Chronic left SI joint pain      5. Weakness of left hip         Physician: Gillian Serna DO     Physician Orders: PT Eval and Treat   L SI instabtilty and gluteal weakness - HEP, NM re-education, and functional movement assessment needed      Medical Diagnosis from Referral: M53.3 (ICD-10-CM) - SI (sacroiliac) joint dysfunction M54.5,G89.29 (ICD-10-CM) - Chronic left-sided low back pain without sciatica M79.10 (ICD-10-CM) - Myalgia   Evaluation Date: 8/25/2020  Authorization Period Expiration: 12/31/2020  Plan of Care Expiration: 11/30/2020 (received signed updated HEP)  Visit # / Visits authorized: 13/ 30    Time In: 4:45 pm  Time Out: 5:30 pm  Total Billable Time: 45 minutes    Precautions: Standard    Subjective     Pt reports: Has been taking the stairs more at work for exercise and brisk walking. Her left SI joint pain greatly improved at the end of the day. Has some stiffness in the morning. Pt reports onset of R knee pain with sitting the other day. Pain worse with descending stairs, SL squats in home program.       She was compliant with home exercise program.  Response to previous treatment: no adverse effects   Functional change: brisk walking    Pain: 3/10  Location: left suprapatellar region     Objective     Patellar mobility: hypomobility with lateral tilt  SL squat: pain provocation    James received  "therapeutic exercises to develop strength and core stabilization for 40  minutes including:    MET for R posterior innominate rotation 5 x 10"  Multifidus with PB againt wall, opp arm lift x 10 ea  Bridging with GTB 5 sec x 20  SLR with magic [place] 2 x 10  SL bridge  10" x 10    Clams 10" x 10, GTB  SL hip abd 10 x  10" with horizontal adduction GTB  Supine elbow to knee iso holds 3 sec x 10 ea  Rolling lower quarter prone<>supine x 3 ea  Tall kneeling 10# chops x 20 ea  Quadruped hip extension x 15    2-1 hop with tactile cueing for hip ABD x 10 ea  Lateral agility 90 sec  Shuttle BLPD in table top x 10  SL leg press shuttel 32# 15 x 3  RDL  2 x 10  Single knee bends 2 x 15   Plank, side 30" x 2 ea  Y balance 5 x 3  Alternate jog/ holds trampoline  Monster walks 40 ft x 2 GTB  Wall sits 45 sec x 4    kinesiotape Y strip R knee, middle decompression strip    James received hot pack for 00 minutes to low back.      Home Exercises Provided and Patient Education Provided     Education provided:   - HEP continuance; follow soreness guidelines for return to run walking program. Added quad isometrics and wall sits 45 deg    Written Home Exercises Provided: Patient instructed to cont prior HEP.  Exercises were reviewed and James was able to demonstrate them prior to the end of the session.  James demonstrated good  understanding of the education provided.     See EMR under Patient Instructions for exercises provided prior visit., 10/15    Assessment     Patient presents to clinic with acute onset R anterior knee pain consistent with patellofemoral pain. Deferred deep squatting and modified with isometrics quads.     James is progressing well towards her goals.   Pt prognosis is Good.     Pt will continue to benefit from skilled outpatient physical therapy to address the deficits listed in the problem list box on initial evaluation, provide pt/family education and to maximize pt's level of independence in the home " and community environment.     Pt's spiritual, cultural and educational needs considered and pt agreeable to plan of care and goals.     Anticipated barriers to physical therapy: none    Goals:        Short Term Goals: 4 weeks   1. Patient to demonstrate improved body mechanics/ technique with deep squat -met  2. Patient to report decreased pain in L Si joint by 30% or greater with ADL's- met  3. Patient to increased AROM in multisegmental rotation within functional limits for improved performance of ADL's- met  4. Patient to increase SLS balance to 10 sec eyes closed for improved stability- in progress, not met     Long Term Goals: 8 weeks   1. Patient to be independent with home exercise program for improved self management of condition  2. Patient to have decreased subjective report of disability as noted by a score of <15% on the FOTO lumbar questionnaire   3. Patient to increased strength in BLE's to 5/5 or greater for improved performance of ADL's   4. Patient to be able to run 1 mile with <2/10 pain    Plan     Continue to progress core stabilization as tolerated    Nereyda Rodas, PT

## 2020-11-24 ENCOUNTER — CLINICAL SUPPORT (OUTPATIENT)
Dept: REHABILITATION | Facility: OTHER | Age: 30
End: 2020-11-24
Payer: COMMERCIAL

## 2020-11-24 DIAGNOSIS — M53.3 CHRONIC LEFT SI JOINT PAIN: ICD-10-CM

## 2020-11-24 DIAGNOSIS — R29.898 WEAKNESS OF LEFT HIP: ICD-10-CM

## 2020-11-24 DIAGNOSIS — G89.29 CHRONIC LEFT SI JOINT PAIN: ICD-10-CM

## 2020-11-24 PROCEDURE — 97110 THERAPEUTIC EXERCISES: CPT | Mod: PN | Performed by: PHYSICAL THERAPIST

## 2020-11-25 NOTE — PROGRESS NOTES
Physical Therapy Treatment Note     Name: James Avelar [facility]  Clinic Number: [id number]    Therapy Diagnosis:   Encounter Diagnoses   Name Primary?    Chronic left SI joint pain     Weakness of left hip      Physician: Gillian Serna DO    Visit Date: 11/24/2020    Therapy Diagnosis:   1. SI (sacroiliac) joint dysfunction  Ambulatory referral/consult to Physical/Occupational Therapy   2. Chronic left-sided low back pain without sciatica  Ambulatory referral/consult to Physical/Occupational Therapy   3. Myalgia  Ambulatory referral/consult to Physical/Occupational Therapy   4. Chronic left SI joint pain      5. Weakness of left hip         Physician: Gillian Serna DO     Physician Orders: PT Eval and Treat   L SI instabtilty and gluteal weakness - HEP, NM re-education, and functional movement assessment needed      Medical Diagnosis from Referral: M53.3 (ICD-10-CM) - SI (sacroiliac) joint dysfunction M54.5,G89.29 (ICD-10-CM) - Chronic left-sided low back pain without sciatica M79.10 (ICD-10-CM) - Myalgia   Evaluation Date: 8/25/2020  Authorization Period Expiration: 12/31/2020  Plan of Care Expiration: 11/30/2020 (received signed updated HEP)  Visit # / Visits authorized: 14/ 30    Time In: 4:47 pm  Time Out: 5:30 pm  Total Billable Time: 42 minutes    Precautions: Standard    Subjective     Pt reports: Not having much SI jt pain and not needing to wear brace for brisk walking. R knee pain improved since last visit and requests the taping today before vacation.       She was compliant with home exercise program.  Response to previous treatment: no adverse effects   Functional change: brisk walking    Pain: 3/10  Location: R suprapatellar region     Objective     Patellar mobility: hypomobility with lateral tilt  Oumar's: positive R  Ely's: negative    MMT   Left  Right    Hip:  Flexion   5/5  5/5  Extension  5/5  5/5 (aberrant movements with resisted  "R)  Abduction  5/5  5/5  Adduction  5/5  5/5      James received therapeutic exercises to develop strength and core stabilization for 40  minutes including:    Treadmill gait training 5 min warm up 2.5-3.0 mph, 2 min run 4.5 mph (150 bpm), 3 min cool down    MET for R posterior innominate rotation 5 x 10"  Multifidus with PB againt wall, opp arm lift x 10 ea  Bridging with GTB 5 sec x 20  SLR with magic [place] 2 x 10  SL bridge  10" x 10    Clams 10" x 10, GTB  Rolling lower quarter prone<>supine x 3 ea  Tall kneeling 10# chops x 20 ea    2-1 hop with tactile cueing for hip ABD x 10 ea  Lateral agility 90 sec  Shuttle BLPD in table top x 10  SL leg press shuttel 32# 15 x 3  RDL  2 x 10  Single knee bends 2 x 15   Plank, side 30" x 2 ea  Y balance 5 x 3  Alternate jog/ holds trampoline  Monster walks 40 ft x 2 GTB  Wall sits 45 sec x 4  Rolling stick x 5 min R It Band    kinesiotape Y strip R knee, middle decompression strip    James received hot pack for 00 minutes to low back.      Home Exercises Provided and Patient Education Provided     Education provided:   - HEP continuance; follow soreness guidelines for return to run walking program.     Written Home Exercises Provided: Patient instructed to cont prior HEP.  Exercises were reviewed and James was able to demonstrate them prior to the end of the session.  James demonstrated good  understanding of the education provided.     See EMR under Patient Instructions for exercises provided prior visit., 10/15    Assessment     Patient progressing with improved B hip strength and subjective report of pain with ADL's. Initiated debbie training with running on treadmill. Pt demonstrating good pelvic alignment, no dynamic valgus collapse, and mid foot strike. Improved absorption with debbie training.     James is progressing well towards her goals.   Pt prognosis is Good.     Pt will continue to benefit from skilled outpatient physical therapy to address the " deficits listed in the problem list box on initial evaluation, provide pt/family education and to maximize pt's level of independence in the home and community environment.     Pt's spiritual, cultural and educational needs considered and pt agreeable to plan of care and goals.     Anticipated barriers to physical therapy: none    Goals:        Short Term Goals: 4 weeks   1. Patient to demonstrate improved body mechanics/ technique with deep squat -met  2. Patient to report decreased pain in L Si joint by 30% or greater with ADL's- met  3. Patient to increased AROM in multisegmental rotation within functional limits for improved performance of ADL's- met  4. Patient to increase SLS balance to 10 sec eyes closed for improved stability- in progress, not met     Long Term Goals: 8 weeks   1. Patient to be independent with home exercise program for improved self management of condition  2. Patient to have decreased subjective report of disability as noted by a score of <15% on the FOTO lumbar questionnaire   3. Patient to increased strength in BLE's to 5/5 or greater for improved performance of ADL's   4. Patient to be able to run 1 mile with <2/10 pain    Plan     Continue to progress core stabilization as tolerated. Run / walk program progression per soreness guidelines for return to run    Nereyda Rodas, PT

## 2020-12-03 ENCOUNTER — CLINICAL SUPPORT (OUTPATIENT)
Dept: REHABILITATION | Facility: OTHER | Age: 30
End: 2020-12-03
Payer: COMMERCIAL

## 2020-12-03 DIAGNOSIS — M53.3 CHRONIC LEFT SI JOINT PAIN: ICD-10-CM

## 2020-12-03 DIAGNOSIS — G89.29 CHRONIC LEFT SI JOINT PAIN: ICD-10-CM

## 2020-12-03 DIAGNOSIS — R29.898 WEAKNESS OF LEFT HIP: ICD-10-CM

## 2020-12-03 PROCEDURE — 97110 THERAPEUTIC EXERCISES: CPT | Mod: PN | Performed by: PHYSICAL THERAPIST

## 2020-12-03 NOTE — PLAN OF CARE
Physical Therapy Treatment Note/ Progress Note     Name: James Avelar [facility]  Clinic Number: [id number]    Therapy Diagnosis:   Encounter Diagnoses   Name Primary?    Chronic left SI joint pain     Weakness of left hip      Physician: Gillian Serna DO    Visit Date: 12/3/2020    Therapy Diagnosis:   1. SI (sacroiliac) joint dysfunction  Ambulatory referral/consult to Physical/Occupational Therapy   2. Chronic left-sided low back pain without sciatica  Ambulatory referral/consult to Physical/Occupational Therapy   3. Myalgia  Ambulatory referral/consult to Physical/Occupational Therapy   4. Chronic left SI joint pain      5. Weakness of left hip         Physician: Gillian Serna DO     Physician Orders: PT Eval and Treat   L SI instabtilty and gluteal weakness - HEP, NM re-education, and functional movement assessment needed      Medical Diagnosis from Referral: M53.3 (ICD-10-CM) - SI (sacroiliac) joint dysfunction M54.5,G89.29 (ICD-10-CM) - Chronic left-sided low back pain without sciatica M79.10 (ICD-10-CM) - Myalgia   Evaluation Date: 8/25/2020  Authorization Period Expiration: 12/31/2020  Plan of Care Expiration: 11/30/2020 (received signed updated HEP)  Visit # / Visits authorized: 14/ 30    Time In: 4:47 pm  Time Out: 5:30 pm  Total Billable Time: 42 minutes    Precautions: Standard    Subjective     Pt reports: 1 week into the run/ walk program following soreness guidelines and not increasing more than 10 percent this week. She was out of town over the holiday and feeling stiff with prolonged car ride and inactivity over the last 6 months. Feels good to return to running and no soreness with week 1 of the interval program. Her R knee was more painful with peloton work out over the weekend and tape seems to help. Would like to continue PT through the end of year and will switch to a new insurance outside of [facility].       She was compliant with home exercise program.  Response to previous  "treatment: no adverse effects   Functional change: brisk walking    Pain: 3/10  Location: R suprapatellar region     Objective     Patellar mobility: hypomobility with lateral tilt  Oumar's: positive R  Ely's: negative    SL squat: increased foot pronation with hip IR and contralateral hip drop  SLS: EO: 20 sec B, EC: L: 8 sec, RL 10 sec  SL hop for distance: L: 48", R: 42"  Tripple Hop: L: 144", R: 120"    MMT   Left  Right    Hip:  Flexion   5/5  5/5  Extension  5/5  5/5 (aberrant movements with resisted R)  Abduction  5/5  5/5  Adduction  5/5  5/5  External rotation 5/5  5/5  Internal rotation 5/5  5/5    FOTO: 28%    James received therapeutic exercises to develop strength and core stabilization for 40  minutes including:    Treadmill gait training 5 min warm up 2.5-3.0 mph, 2 min run 4.5 mph (150 bpm), 3 min cool down     MET for R posterior innominate rotation 5 x 10"  Multifidus with PB againt wall, opp arm lift x 10 ea  Bridging with GTB 5 sec x 20  SLR with magic [place] 2 x 10  SL bridge  10" x 10    Rolling lower quarter prone<>supine x 3 ea  Tall 1/2 kneeling 10# chops x 20 ea  Paloff press in SLS 3# 2 x 10 ea    2-1 hop with tactile cueing for hip ABD x 10 ea  Lateral agility 90 sec  Shuttle BLPD in table top x 10  SL leg press shuttel 32# 15 x 3  RDL  2 x 10  Single knee bends 2 x 15   Plank, side 30" x 2 ea  Y balance 5 x 3  Alternate jog/ holds trampoline  Monster walks 40 ft x 2 GTB  Wall sits 45 sec x 4  Rolling stick x 5 min R It Band    kinesiotape Y strip R knee, middle decompression strip    James received hot pack for 00 minutes to low back.      Home Exercises Provided and Patient Education Provided     Education provided:   - HEP continuance; follow soreness guidelines for return to run walking program and okay to start weeks 2 of the program    Written Home Exercises Provided: Patient instructed to cont prior HEP.  Exercises were reviewed and James was able to demonstrate them prior to " the end of the session.  James demonstrated good  understanding of the education provided.     See EMR under Patient Instructions for exercises provided prior visit., 10/15    Assessment     Patient progressing with improved B hip strength and subjective report of pain with ADL's. Continues with decreased knee flexion and absorption with landing and c/o patellofemoral pain. L SI jt pain significantly improved since beginning care. Pt to benefit from continuing 1 time per week for 2-4 more visits to progress core stabilization as tolerated. Run / walk program progression per soreness guidelines for return to run    James is progressing well towards her goals.   Pt prognosis is Good.     Pt will continue to benefit from skilled outpatient physical therapy to address the deficits listed in the problem list box on initial evaluation, provide pt/family education and to maximize pt's level of independence in the home and community environment.     Pt's spiritual, cultural and educational needs considered and pt agreeable to plan of care and goals.     Anticipated barriers to physical therapy: none    Goals:        Short Term Goals: 4 weeks   1. Patient to demonstrate improved body mechanics/ technique with deep squat -met  2. Patient to report decreased pain in L Si joint by 30% or greater with ADL's- met  3. Patient to increased AROM in multisegmental rotation within functional limits for improved performance of ADL's- met  4. Patient to increase SLS balance to 10 sec eyes closed for improved stability-met     Long Term Goals: 8 weeks   1. Patient to be independent with home exercise program for improved self management of condition (in progress)  2. Patient to have decreased subjective report of disability as noted by a score of <15% on the FOTO lumbar questionnaire (28%)  3. Patient to increased strength in BLE's to 5/5 or greater for improved performance of ADL's   4. Patient to be able to run 1 mile with <2/10  pain (in progress)    Plan     Updated POC : 11/30/2020 to 12/31/2020    Outpatient Physical Therapy 1 times weekly for 3-4 weeks to include the following interventions: Gait Training, Manual Therapy, Moist Heat/ Ice, Neuromuscular Re-ed, Patient Education, Therapeutic Activites, Therapeutic Exercise and dry needling.       Nereyda Rodas, PT

## 2020-12-08 ENCOUNTER — PATIENT MESSAGE (OUTPATIENT)
Dept: REHABILITATION | Facility: OTHER | Age: 30
End: 2020-12-08

## 2020-12-10 ENCOUNTER — CLINICAL SUPPORT (OUTPATIENT)
Dept: REHABILITATION | Facility: OTHER | Age: 30
End: 2020-12-10
Payer: COMMERCIAL

## 2020-12-10 DIAGNOSIS — M53.3 CHRONIC LEFT SI JOINT PAIN: ICD-10-CM

## 2020-12-10 DIAGNOSIS — R29.898 WEAKNESS OF LEFT HIP: ICD-10-CM

## 2020-12-10 DIAGNOSIS — G89.29 CHRONIC LEFT SI JOINT PAIN: ICD-10-CM

## 2020-12-10 PROCEDURE — 97110 THERAPEUTIC EXERCISES: CPT | Mod: PN | Performed by: PHYSICAL THERAPIST

## 2020-12-10 PROCEDURE — 97140 MANUAL THERAPY 1/> REGIONS: CPT | Mod: PN | Performed by: PHYSICAL THERAPIST

## 2020-12-10 NOTE — PROGRESS NOTES
Physical Therapy Treatment Note      Name: James Avelar [facility]  Clinic Number: [id number]     Therapy Diagnosis:        Encounter Diagnoses   Name Primary?    Chronic left SI joint pain      Weakness of left hip        Physician: Gillian Serna DO     Visit Date: 12/10/2020     Therapy Diagnosis:   1. SI (sacroiliac) joint dysfunction  Ambulatory referral/consult to Physical/Occupational Therapy   2. Chronic left-sided low back pain without sciatica  Ambulatory referral/consult to Physical/Occupational Therapy   3. Myalgia  Ambulatory referral/consult to Physical/Occupational Therapy   4. Chronic left SI joint pain      5. Weakness of left hip         Physician: Gillian Serna DO     Physician Orders: PT Eval and Treat   L SI instabtilty and gluteal weakness - HEP, NM re-education, and functional movement assessment needed      Medical Diagnosis from Referral: M53.3 (ICD-10-CM) - SI (sacroiliac) joint dysfunction M54.5,G89.29 (ICD-10-CM) - Chronic left-sided low back pain without sciatica M79.10 (ICD-10-CM) - Myalgia   Evaluation Date: 8/25/2020  Authorization Period Expiration: 12/31/2020  Plan of Care Expiration: 11/30/2020 (received signed updated HEP)  Visit # / Visits authorized: 16/ 30     Time In: 3:45 pm  Time Out: 4:45 pm  Total Billable Time: 45 minutes     Precautions: Standard     Subjective      Pt reports: Her L SI jt improved and more soreness R knee with running. Took 2 days off per soreness guidelines.       She was compliant with home exercise program.  Response to previous treatment: no adverse effects   Functional change: brisk walking     Pain: 3/10  Location: R suprapatellar region      Objective         FOTO: 28%     James received therapeutic exercises to develop strength and core stabilization for 40  minutes including:     Treadmill gait training 5 min warm up 3.0 mph, 1 min run (150 bpm)/ 1 min walk (4.5 mph) x 3      MET for R posterior innominate rotation 5 x  "10"  Multifidus with PB againt wall, opp arm lift x 10 ea  Bridging with GTB 5 sec x 20  SLR with magic [place] 2 x 10  SL bridge  10" x 10     Rolling lower quarter prone<>supine x 3 ea  Tall 1/2 kneeling 10# chops x 20 ea  Paloff press in SLS 3# 2 x 10 ea     2-1 hop with tactile cueing for hip ABD x 10 ea  Lateral agility 90 sec  Shuttle BLPD in table top x 10  SL leg press shuttel 32# 15 x 3  RDL  2 x 10  Single knee bends 2 x 15   Plank, side 30" x 2 ea  Y balance 5 x 3  Alternate jog/ holds trampoline  Monster walks 40 ft x 2 GTB  Wall sits 45 sec x 4  Rolling stick x 5 min R It Band  Hamstrings str 30" x 3 ea  IT band str 30" x 3  Eccentric quads 3# 3 x 10    Application of FDN: Pt educated on benefits and potential side effects of dry needling. Educated pt on benefits, precautions, side effects followign IDN. Educated pt to use heat following treatment sessions if pt is experiencing pain or soreness. Pt verbalized good understanding of education.  Pt signed written consent to dry needling Rx. Pt gave verbal consent for DN    Pt received dry needling to the below listed muscles using 75mm, 60mm, 50mm needles.  TFL R  IT band R  Gluteus minimus R  Lateral gastroc head R  Rectus femoris R       kinesiotape Y strip R knee, middle decompression strip- not today          Home Exercises Provided and Patient Education Provided      Education provided:   - HEP continuance; follow soreness guidelines for return to run walking program and okay to start weeks 2 of the program     Written Home Exercises Provided: Patient instructed to cont prior HEP.  Exercises were reviewed and James was able to demonstrate them prior to the end of the session.  James demonstrated good  understanding of the education provided.      See EMR under Patient Instructions for exercises provided prior visit., 10/15     Assessment      Patient with suprapatellar knee pain/ quad tendon region with SL squat and running. Positive lateral tilt " test and tight retinaculum. Good response to functional dry needling with resolution of pain with squat following. Progressing eccentric quads this session.      James is progressing well towards her goals.   Pt prognosis is Good.      Pt will continue to benefit from skilled outpatient physical therapy to address the deficits listed in the problem list box on initial evaluation, provide pt/family education and to maximize pt's level of independence in the home and community environment.      Pt's spiritual, cultural and educational needs considered and pt agreeable to plan of care and goals.     Anticipated barriers to physical therapy: none     Goals:         Short Term Goals: 4 weeks   1. Patient to demonstrate improved body mechanics/ technique with deep squat -met  2. Patient to report decreased pain in L Si joint by 30% or greater with ADL's- met  3. Patient to increased AROM in multisegmental rotation within functional limits for improved performance of ADL's- met  4. Patient to increase SLS balance to 10 sec eyes closed for improved stability-met     Long Term Goals: 8 weeks   1. Patient to be independent with home exercise program for improved self management of condition (in progress)  2. Patient to have decreased subjective report of disability as noted by a score of <15% on the FOTO lumbar questionnaire (28%)  3. Patient to increased strength in BLE's to 5/5 or greater for improved performance of ADL's   4. Patient to be able to run 1 mile with <2/10 pain (in progress)     Plan      Updated POC : 11/30/2020 to 12/31/2020     Outpatient Physical Therapy 1 times weekly for 3-4 weeks to include the following interventions: Gait Training, Manual Therapy, Moist Heat/ Ice, Neuromuscular Re-ed, Patient Education, Therapeutic Activites, Therapeutic Exercise and dry needling.         Nereyda Rodas, PT

## 2020-12-15 ENCOUNTER — TELEPHONE (OUTPATIENT)
Dept: OBSTETRICS AND GYNECOLOGY | Facility: CLINIC | Age: 30
End: 2020-12-15
Payer: OTHER GOVERNMENT

## 2020-12-16 ENCOUNTER — PATIENT MESSAGE (OUTPATIENT)
Dept: OBSTETRICS AND GYNECOLOGY | Facility: CLINIC | Age: 30
End: 2020-12-16

## 2020-12-17 ENCOUNTER — CLINICAL SUPPORT (OUTPATIENT)
Dept: REHABILITATION | Facility: OTHER | Age: 30
End: 2020-12-17
Payer: COMMERCIAL

## 2020-12-17 DIAGNOSIS — G89.29 CHRONIC LEFT SI JOINT PAIN: ICD-10-CM

## 2020-12-17 DIAGNOSIS — M53.3 CHRONIC LEFT SI JOINT PAIN: ICD-10-CM

## 2020-12-17 DIAGNOSIS — R29.898 WEAKNESS OF LEFT HIP: ICD-10-CM

## 2020-12-17 PROCEDURE — 97110 THERAPEUTIC EXERCISES: CPT | Mod: PN | Performed by: PHYSICAL THERAPIST

## 2020-12-17 PROCEDURE — 97140 MANUAL THERAPY 1/> REGIONS: CPT | Mod: PN | Performed by: PHYSICAL THERAPIST

## 2020-12-17 NOTE — PROGRESS NOTES
Physical Therapy Treatment Note      Name: James Avelar [facility]  Clinic Number: [id number]     Therapy Diagnosis:        Encounter Diagnoses   Name Primary?    Chronic left SI joint pain      Weakness of left hip        Physician: Gillian Serna DO     Visit Date: 12/17/2020     Therapy Diagnosis:   1. SI (sacroiliac) joint dysfunction  Ambulatory referral/consult to Physical/Occupational Therapy   2. Chronic left-sided low back pain without sciatica  Ambulatory referral/consult to Physical/Occupational Therapy   3. Myalgia  Ambulatory referral/consult to Physical/Occupational Therapy   4. Chronic left SI joint pain      5. Weakness of left hip         Physician: Gillian Serna DO     Physician Orders: PT Eval and Treat   L SI instabtilty and gluteal weakness - HEP, NM re-education, and functional movement assessment needed      Medical Diagnosis from Referral: M53.3 (ICD-10-CM) - SI (sacroiliac) joint dysfunction M54.5,G89.29 (ICD-10-CM) - Chronic left-sided low back pain without sciatica M79.10 (ICD-10-CM) - Myalgia   Evaluation Date: 8/25/2020  Authorization Period Expiration: 12/31/2020  Plan of Care Expiration: 11/30/2020 (received signed updated HEP)  Visit # / Visits authorized: 16/ 30     Time In: 3:55 pm  Time Out: 4:45 pm  Total Billable Time: 45 minutes     Precautions: Standard     Subjective      Pt reports: forgetting her belt on her last run and more SI jt pain during the run, but resolved quickly after. Having more pain R knee with taking the stairs at work. Also noted B heel pain with running      She was compliant with home exercise program.  Response to previous treatment: no adverse effects   Functional change: brisk walking     Pain: 3/10  Location: R medial knee with Sl squats     Objective         FOTO: 28%    Windlass test: positive B  Tinel's: negative, no paresthesias or numbness     James received therapeutic exercises to develop strength and core stabilization for 30   "minutes including:     Treadmill gait training 5 min warm up 3.0 mph, 1 min run (150 bpm)/ 1 min walk (4.5 mph) x 3      MET for R posterior innominate rotation 5 x 10"  Multifidus with PB againt wall, opp arm lift x 10 ea  Bridging with GTB 5 sec x 20  SLR with magic [place] 2 x 10  SL bridge  10" x 10     Rolling lower quarter prone<>supine x 3 ea  Tall 1/2 kneeling 10# chops x 20 ea  Paloff press in SLS 3# 2 x 10 ea     2-1 hop with tactile cueing for hip ABD x 10 ea  Lateral agility 90 sec  Shuttle BLPD in table top x 10  SL leg press shuttel 32# 15 x 3  RDL  2 x 10  Single knee bends 2 x 15   Plank, side 30" x 2 ea  Y balance 5 x 3  Alternate jog/ holds trampoline  Monster walks 40 ft x 2 GTB  Wall sits 45 sec x 4  Rolling stick x 5 min R It Band  Hamstrings str 30" x 3 ea  IT band str 30" x 3  Eccentric quads 3# 3 x 10    Application of FDN: Pt educated on benefits and potential side effects of dry needling. Educated pt on benefits, precautions, side effects followign IDN. Educated pt to use heat following treatment sessions if pt is experiencing pain or soreness. Pt verbalized good understanding of education.  Pt signed written consent to dry needling Rx. Pt gave verbal consent for DN    Pt received dry needling to the below listed muscles using 75mm, 60mm, 50mm needles.  TFL R  IT band R  Gluteus minimus R  Gastrocnemius B       kinesiotape Y strip R knee, middle decompression strip- not today          Home Exercises Provided and Patient Education Provided      Education provided:   - HEP continuance; follow soreness guidelines for return to run walking program and okay to start weeks 2 of the program     Written Home Exercises Provided: Patient instructed to cont prior HEP.  Exercises were reviewed and James was able to demonstrate them prior to the end of the session.  James demonstrated good  understanding of the education provided.      See EMR under Patient Instructions for exercises provided prior " visit., 10/15     Assessment      TTP medial calcaneal tubercle and longitudinal arch. No morning pain and only precipitated by running or increased weight bearing. Recommended pulse orthotics for short term management of symptoms and education in PF and gastroc/ soleus stretching program. Good tolerance to FDN without adverse effects. Performance of therapeutic exercise without exacerbation of R medial knee pain.      James is progressing well towards her goals.   Pt prognosis is Good.      Pt will continue to benefit from skilled outpatient physical therapy to address the deficits listed in the problem list box on initial evaluation, provide pt/family education and to maximize pt's level of independence in the home and community environment.      Pt's spiritual, cultural and educational needs considered and pt agreeable to plan of care and goals.     Anticipated barriers to physical therapy: none     Goals:         Short Term Goals: 4 weeks   1. Patient to demonstrate improved body mechanics/ technique with deep squat -met  2. Patient to report decreased pain in L Si joint by 30% or greater with ADL's- met  3. Patient to increased AROM in multisegmental rotation within functional limits for improved performance of ADL's- met  4. Patient to increase SLS balance to 10 sec eyes closed for improved stability-met     Long Term Goals: 8 weeks   1. Patient to be independent with home exercise program for improved self management of condition (in progress)  2. Patient to have decreased subjective report of disability as noted by a score of <15% on the FOTO lumbar questionnaire (28%)  3. Patient to increased strength in BLE's to 5/5 or greater for improved performance of ADL's   4. Patient to be able to run 1 mile with <2/10 pain (in progress)     Plan      Updated POC : 11/30/2020 to 12/31/2020     Outpatient Physical Therapy 1 times weekly for 3-4 weeks to include the following interventions: Gait Training, Manual  Therapy, Moist Heat/ Ice, Neuromuscular Re-ed, Patient Education, Therapeutic Activites, Therapeutic Exercise and dry needling.         Nereyda Rodas, PT

## 2020-12-22 ENCOUNTER — IMMUNIZATION (OUTPATIENT)
Dept: INTERNAL MEDICINE | Facility: CLINIC | Age: 30
End: 2020-12-22
Payer: COMMERCIAL

## 2020-12-22 ENCOUNTER — CLINICAL SUPPORT (OUTPATIENT)
Dept: REHABILITATION | Facility: OTHER | Age: 30
End: 2020-12-22
Payer: COMMERCIAL

## 2020-12-22 DIAGNOSIS — G89.29 CHRONIC LEFT SI JOINT PAIN: ICD-10-CM

## 2020-12-22 DIAGNOSIS — Z23 NEED FOR VACCINATION: ICD-10-CM

## 2020-12-22 DIAGNOSIS — M53.3 CHRONIC LEFT SI JOINT PAIN: ICD-10-CM

## 2020-12-22 DIAGNOSIS — R29.898 WEAKNESS OF LEFT HIP: ICD-10-CM

## 2020-12-22 PROCEDURE — 0001A COVID-19, MRNA, LNP-S, PF, 30 MCG/0.3 ML DOSE VACCINE: CPT | Mod: CV19,,, | Performed by: INTERNAL MEDICINE

## 2020-12-22 PROCEDURE — 97110 THERAPEUTIC EXERCISES: CPT | Mod: PN | Performed by: PHYSICAL THERAPIST

## 2020-12-22 PROCEDURE — 0001A COVID-19, MRNA, LNP-S, PF, 30 MCG/0.3 ML DOSE VACCINE: ICD-10-PCS | Mod: CV19,,, | Performed by: INTERNAL MEDICINE

## 2020-12-22 PROCEDURE — 91300 COVID-19, MRNA, LNP-S, PF, 30 MCG/0.3 ML DOSE VACCINE: ICD-10-PCS | Mod: ,,, | Performed by: INTERNAL MEDICINE

## 2020-12-22 PROCEDURE — 91300 COVID-19, MRNA, LNP-S, PF, 30 MCG/0.3 ML DOSE VACCINE: CPT | Mod: ,,, | Performed by: INTERNAL MEDICINE

## 2020-12-22 PROCEDURE — 97140 MANUAL THERAPY 1/> REGIONS: CPT | Mod: PN | Performed by: PHYSICAL THERAPIST

## 2020-12-23 NOTE — PROGRESS NOTES
Physical Therapy Treatment Note      Name: James Avelar [facility]  Clinic Number: [id number]     Therapy Diagnosis:        Encounter Diagnoses   Name Primary?    Chronic left SI joint pain      Weakness of left hip        Physician: Gillian Serna DO     Visit Date: 12/22/2020     Therapy Diagnosis:   1. SI (sacroiliac) joint dysfunction  Ambulatory referral/consult to Physical/Occupational Therapy   2. Chronic left-sided low back pain without sciatica  Ambulatory referral/consult to Physical/Occupational Therapy   3. Myalgia  Ambulatory referral/consult to Physical/Occupational Therapy   4. Chronic left SI joint pain      5. Weakness of left hip         Physician: Gillian Serna DO     Physician Orders: PT Eval and Treat   L SI instabtilty and gluteal weakness - HEP, NM re-education, and functional movement assessment needed      Medical Diagnosis from Referral: M53.3 (ICD-10-CM) - SI (sacroiliac) joint dysfunction M54.5,G89.29 (ICD-10-CM) - Chronic left-sided low back pain without sciatica M79.10 (ICD-10-CM) - Myalgia   Evaluation Date: 8/25/2020  Authorization Period Expiration: 12/31/2020  Plan of Care Expiration: 11/30/2020 (received signed updated HEP)  Visit # / Visits authorized: 17/ 30     Time In: 4:00 pm  Time Out: 4:45 pm  Total Billable Time: 45 minutes     Precautions: Standard     Subjective      Pt reports: up to 3 minute run intervals without any L SI jt pain. Main limitation is R knee pain with running. Starting a new fitness program that includes plymometric and has been modified the jumping to squats with toe raises.     She was compliant with home exercise program.  Response to previous treatment: no adverse effects   Functional change: brisk walking     Pain: 3/10  Location: R medial knee with Sl squats     Objective         FOTO: 28%    12/22    VIRIDIANA's: R: (+), L: (-)       James received therapeutic exercises to develop strength and core stabilization for 30  minutes  "including:     MET for R posterior innominate rotation 5 x 10"- NT  Multifidus with PB againt wall, opp arm lift x 10 ea  Bridging with GTB 5 sec x 20  SLR with magic [place] 2 x 10  SL bridge  10" x 10     Rolling lower quarter prone<>supine x 3 ea  Tall 1/2 kneeling 10# chops x 20 ea  Paloff press in SLS 3# 2 x 10 ea     2-1 hop with tactile cueing for hip ABD x 10 ea  Lateral agility 90 sec  Shuttle jumps 1 cord 30" x 2  SL leg press shuttel 32# 15 x 3  RDL  2 x 10  Single knee bends 2 x 15   Plank, side 30" x 2 ea  Y balance 5 x 3  Alternate jog/ holds trampoline  Monster walks 40 ft x 2 GTB  Wall sits 45 sec x 4  Rolling stick x 5 min R It Band  Hamstrings str 30" x 3 ea  IT band str 30" x 3  Eccentric quads 3# 3 x 10  GSS slant board 90 sec    Application of FDN: Pt educated on benefits and potential side effects of dry needling. Educated pt on benefits, precautions, side effects followign IDN. Educated pt to use heat following treatment sessions if pt is experiencing pain or soreness. Pt verbalized good understanding of education.  Pt signed written consent to dry needling Rx. Pt gave verbal consent for DN    Pt received dry needling to the below listed muscles using 75mm, 60mm, 50mm needles.  TFL R  IT band R  Gluteus minimus R  Gastrocnemius B    kinesiotape Y strip R knee, middle decompression strip          Home Exercises Provided and Patient Education Provided      Education provided:   - HEP continuance; follow soreness guidelines for return to run walking program and okay to start weeks 2 of the program     Written Home Exercises Provided: Patient instructed to cont prior HEP.  Exercises were reviewed and James was able to demonstrate them prior to the end of the session.  James demonstrated good  understanding of the education provided.      See EMR under Patient Instructions for exercises provided prior visit., 10/15     Assessment      Continues with patellofemoral pain with jumping and running " activities. Good tolerance to quad and gluteal strengthening this session.      James is progressing well towards her goals.   Pt prognosis is Good.      Pt will continue to benefit from skilled outpatient physical therapy to address the deficits listed in the problem list box on initial evaluation, provide pt/family education and to maximize pt's level of independence in the home and community environment.      Pt's spiritual, cultural and educational needs considered and pt agreeable to plan of care and goals.     Anticipated barriers to physical therapy: none     Goals:         Short Term Goals: 4 weeks   1. Patient to demonstrate improved body mechanics/ technique with deep squat -met  2. Patient to report decreased pain in L Si joint by 30% or greater with ADL's- met  3. Patient to increased AROM in multisegmental rotation within functional limits for improved performance of ADL's- met  4. Patient to increase SLS balance to 10 sec eyes closed for improved stability-met     Long Term Goals: 8 weeks   1. Patient to be independent with home exercise program for improved self management of condition (in progress)  2. Patient to have decreased subjective report of disability as noted by a score of <15% on the FOTO lumbar questionnaire (28%)  3. Patient to increased strength in BLE's to 5/5 or greater for improved performance of ADL's   4. Patient to be able to run 1 mile with <2/10 pain (in progress)     Plan      Updated POC : 11/30/2020 to 12/31/2020     Outpatient Physical Therapy 1 times weekly for 3-4 weeks to include the following interventions: Gait Training, Manual Therapy, Moist Heat/ Ice, Neuromuscular Re-ed, Patient Education, Therapeutic Activites, Therapeutic Exercise and dry needling.         Nereyda Rodas, PT

## 2020-12-31 ENCOUNTER — CLINICAL SUPPORT (OUTPATIENT)
Dept: REHABILITATION | Facility: OTHER | Age: 30
End: 2020-12-31
Payer: COMMERCIAL

## 2020-12-31 DIAGNOSIS — M53.3 CHRONIC LEFT SI JOINT PAIN: ICD-10-CM

## 2020-12-31 DIAGNOSIS — R29.898 WEAKNESS OF LEFT HIP: ICD-10-CM

## 2020-12-31 DIAGNOSIS — G89.29 CHRONIC LEFT SI JOINT PAIN: ICD-10-CM

## 2020-12-31 PROCEDURE — 97140 MANUAL THERAPY 1/> REGIONS: CPT | Mod: PN | Performed by: PHYSICAL THERAPIST

## 2020-12-31 PROCEDURE — 97110 THERAPEUTIC EXERCISES: CPT | Mod: PN | Performed by: PHYSICAL THERAPIST

## 2021-01-01 NOTE — PROGRESS NOTES
Physical Therapy Treatment Note/ discharge note      Name: James Avelar [facility]  Clinic Number: [id number]     Therapy Diagnosis:        Encounter Diagnoses   Name Primary?    Chronic left SI joint pain      Weakness of left hip        Physician: Gillian Serna DO     Visit Date: 12/31/2020     Therapy Diagnosis:   1. SI (sacroiliac) joint dysfunction  Ambulatory referral/consult to Physical/Occupational Therapy   2. Chronic left-sided low back pain without sciatica  Ambulatory referral/consult to Physical/Occupational Therapy   3. Myalgia  Ambulatory referral/consult to Physical/Occupational Therapy   4. Chronic left SI joint pain      5. Weakness of left hip         Physician: Gillian Serna DO     Physician Orders: PT Eval and Treat   L SI instabtilty and gluteal weakness - HEP, NM re-education, and functional movement assessment needed      Medical Diagnosis from Referral: M53.3 (ICD-10-CM) - SI (sacroiliac) joint dysfunction M54.5,G89.29 (ICD-10-CM) - Chronic left-sided low back pain without sciatica M79.10 (ICD-10-CM) - Myalgia   Evaluation Date: 8/25/2020  Authorization Period Expiration: 12/31/2020  Plan of Care Expiration: 11/30/2020 (received signed updated HEP)  Visit # / Visits authorized: 18/ 30     Time In: 4:00 pm  Time Out: 4:45 pm  Total Billable Time: 45 minutes     Precautions: Standard     Subjective      Pt reports: Has been able to run without any L SI jt or knee pain. She has started a new exercise routine including squats and lunges with no difficulty. However, today she did a patient transfer and felt pain right at L SI jt. She is requesting dry needling and hopes the pain resolves within a few days back to baseline. She is on a new insurance plan in the new year and today will be her last day.    She was compliant with home exercise program.  Response to previous treatment: no adverse effects   Functional change: return to run no SI jt pain     Pain: 4/10  Location: L Si jt  "region     Objective         FOTO: 6%    MMT BLE's grossly 5/5     Selective Functional Movement Assessment:  FN: functional, non-painful  FP: functional, painful  DP: dysfunctional, painful  DN: dysfunctional, non-painful    Multi-Segmental Flexion: DP  Multi-Segmental Extension: FN  Multi-Segmental Rotation:   Right:FN  Left:FN  Single Leg Stance:  Right:FN  Left:FN  Overhead Deep Squat: DN       James received therapeutic exercises to develop strength and core stabilization for 20  minutes including:     MET for R anterior innominate rotation 5 x 10"    TrA with magic ring and OTB pull downs x 20  BKFO GTB x 20 ea  Bridging GTB x 20    Manual therapy x 25 min    Application of FDN: Pt educated on benefits and potential side effects of dry needling. Educated pt on benefits, precautions, side effects followign IDN. Educated pt to use heat following treatment sessions if pt is experiencing pain or soreness. Pt verbalized good understanding of education.  Pt signed written consent to dry needling Rx. Pt gave verbal consent for DN    Pt received dry needling to the below listed muscles using 75mm, 60mm, 50mm needles.  TFL L  Vastus lateralis L  Gluteus minimus L  QL L   multifidus L4-5 B    Star with decompression I strips L SI      Home Exercises Provided and Patient Education Provided      Education provided:   - HEP continuance; follow soreness guidelines for return to run walking program; orthotics for PFP     Written Home Exercises Provided: Patient instructed to cont prior HEP.  Exercises were reviewed and James was able to demonstrate them prior to the end of the session.  James demonstrated good  understanding of the education provided.      See EMR under Patient Instructions for exercises provided prior visit., 10/15     Assessment     Patient has made good progress with full spinal ROM, hip strength, and improved lumbopelvic stability. Pt has been able to return to running without L SI jt pain and mild " R patellofemoral knee pain. Presents to clinic with acute exacerbation of pain following patient transfer today. Good response to FDN with resolution of symptoms with bending following. Pt's insurance is changing in the new year and plan to discharge to a HEP. If symptoms fail to improve, recommended obtain new referral to be seen under approved provider. Pt to contact office if any problems or questions arise with HEP.         Short Term Goals: 4 weeks   1. Patient to demonstrate improved body mechanics/ technique with deep squat -met  2. Patient to report decreased pain in L Si joint by 30% or greater with ADL's- met  3. Patient to increased AROM in multisegmental rotation within functional limits for improved performance of ADL's- met  4. Patient to increase SLS balance to 10 sec eyes closed for improved stability-met     Long Term Goals: 8 weeks   1. Patient to be independent with home exercise program for improved self management of condition (met)  2. Patient to have decreased subjective report of disability as noted by a score of <15% on the FOTO lumbar questionnaire (met, 6%)  3. Patient to increased strength in BLE's to 5/5 or greater for improved performance of ADL's (met)  4. Patient to be able to run 1 mile with <2/10 pain (not consistently met)     Plan      Patient is discharged from outpatient physical therapy        Nereyda Rodas, PT

## 2021-01-12 ENCOUNTER — IMMUNIZATION (OUTPATIENT)
Dept: INTERNAL MEDICINE | Facility: CLINIC | Age: 31
End: 2021-01-12
Payer: COMMERCIAL

## 2021-01-12 DIAGNOSIS — Z23 NEED FOR VACCINATION: ICD-10-CM

## 2021-01-12 PROCEDURE — 0002A COVID-19, MRNA, LNP-S, PF, 30 MCG/0.3 ML DOSE VACCINE: CPT | Mod: PBBFAC | Performed by: INTERNAL MEDICINE

## 2021-01-12 PROCEDURE — 91300 COVID-19, MRNA, LNP-S, PF, 30 MCG/0.3 ML DOSE VACCINE: CPT | Mod: PBBFAC | Performed by: INTERNAL MEDICINE

## 2021-04-05 ENCOUNTER — PATIENT MESSAGE (OUTPATIENT)
Dept: ADMINISTRATIVE | Facility: HOSPITAL | Age: 31
End: 2021-04-05

## 2021-04-20 ENCOUNTER — PATIENT MESSAGE (OUTPATIENT)
Dept: ADMINISTRATIVE | Facility: HOSPITAL | Age: 31
End: 2021-04-20

## 2021-04-20 ENCOUNTER — PATIENT MESSAGE (OUTPATIENT)
Dept: OBSTETRICS AND GYNECOLOGY | Facility: CLINIC | Age: 31
End: 2021-04-20

## 2021-04-21 ENCOUNTER — PATIENT OUTREACH (OUTPATIENT)
Dept: ADMINISTRATIVE | Facility: HOSPITAL | Age: 31
End: 2021-04-21

## 2021-12-20 ENCOUNTER — CLINICAL SUPPORT (OUTPATIENT)
Dept: URGENT CARE | Facility: CLINIC | Age: 31
End: 2021-12-20
Payer: OTHER GOVERNMENT

## 2021-12-20 DIAGNOSIS — J02.9 SORE THROAT: Primary | ICD-10-CM

## 2021-12-20 DIAGNOSIS — J02.9 SORE THROAT: ICD-10-CM

## 2021-12-20 LAB
CTP QC/QA: YES
SARS-COV-2 RDRP RESP QL NAA+PROBE: NEGATIVE

## 2021-12-20 PROCEDURE — U0002: ICD-10-PCS | Mod: QW,S$GLB,, | Performed by: PREVENTIVE MEDICINE

## 2021-12-20 PROCEDURE — U0002 COVID-19 LAB TEST NON-CDC: HCPCS | Mod: QW,S$GLB,, | Performed by: PREVENTIVE MEDICINE

## 2021-12-27 ENCOUNTER — CLINICAL SUPPORT (OUTPATIENT)
Dept: URGENT CARE | Facility: CLINIC | Age: 31
End: 2021-12-27

## 2021-12-27 DIAGNOSIS — Z11.9 ENCOUNTER FOR SCREENING EXAMINATION FOR INFECTIOUS DISEASE: Primary | ICD-10-CM

## 2021-12-27 LAB
CTP QC/QA: YES
SARS-COV-2 RDRP RESP QL NAA+PROBE: NEGATIVE

## 2021-12-27 PROCEDURE — U0002: ICD-10-PCS | Mod: QW,S$GLB,, | Performed by: EMERGENCY MEDICINE

## 2021-12-27 PROCEDURE — U0002 COVID-19 LAB TEST NON-CDC: HCPCS | Mod: QW,S$GLB,, | Performed by: EMERGENCY MEDICINE
